# Patient Record
Sex: MALE | Race: WHITE | Employment: FULL TIME | URBAN - METROPOLITAN AREA
[De-identification: names, ages, dates, MRNs, and addresses within clinical notes are randomized per-mention and may not be internally consistent; named-entity substitution may affect disease eponyms.]

---

## 2020-07-21 ENCOUNTER — OFFICE VISIT (OUTPATIENT)
Dept: URGENT CARE | Facility: CLINIC | Age: 60
End: 2020-07-21
Payer: COMMERCIAL

## 2020-07-21 VITALS
OXYGEN SATURATION: 96 % | BODY MASS INDEX: 29.73 KG/M2 | WEIGHT: 185 LBS | HEIGHT: 66 IN | HEART RATE: 88 BPM | TEMPERATURE: 97.9 F

## 2020-07-21 DIAGNOSIS — Z11.59 SCREENING FOR VIRAL DISEASE: Primary | ICD-10-CM

## 2020-07-21 PROCEDURE — G0382 LEV 3 HOSP TYPE B ED VISIT: HCPCS | Performed by: EMERGENCY MEDICINE

## 2020-07-21 PROCEDURE — U0003 INFECTIOUS AGENT DETECTION BY NUCLEIC ACID (DNA OR RNA); SEVERE ACUTE RESPIRATORY SYNDROME CORONAVIRUS 2 (SARS-COV-2) (CORONAVIRUS DISEASE [COVID-19]), AMPLIFIED PROBE TECHNIQUE, MAKING USE OF HIGH THROUGHPUT TECHNOLOGIES AS DESCRIBED BY CMS-2020-01-R: HCPCS | Performed by: EMERGENCY MEDICINE

## 2020-07-21 NOTE — PATIENT INSTRUCTIONS
1   Quarantine yourself for 14 days after the onset of your symptoms or until all symptoms are resolved for 72 hours which ever is the longer  2   Your COVID results are pending  Please check on MY CHART for your results  3   If worsening symptoms, go directly to the ER for evaluation

## 2020-07-21 NOTE — PROGRESS NOTES
[facility]        NAME: Bib Munoz is a 62 y o  male  : 1960    MRN: [id number]  DATE: 2020  TIME: 1:42 PM    Assessment and Plan   Screening for viral disease [Z11 [id number]]  1  Screening for viral disease  MISC COVID-19 TEST- Office Collection         Patient Instructions     Patient Instructions   1  Quarantine yourself for 14 days after the onset of your symptoms or until all symptoms are resolved for 72 hours which ever is the longer  2   Your COVID results are pending  Please check on MY CHART for your results  3   If worsening symptoms, go directly to the ER for evaluation  Follow up with PCP in 3-5 days  Proceed to  ER if symptoms worsen  Chief Complaint     Chief Complaint   Patient presents with    COVID testing     here for COVID testing, exposed to someone approx 7 days ago who tested positive for COVID  Pt c/o fever of 100 at home, SOB, cough and body aches since   History of Present Illness       This is a 64-year-old male who presents for COVID testing  He states he was exposed to a COVID positive person 7 days ago  He spent the entire afternoon with this person he was wearing a mask at the time  Three days ago the patient began to experience some shortness of breath elevation of his temperature body aches and a cough which is sometimes productive  He has been using both Tylenol and Mucinex  Patient has also traveled between [place] and [place] in the last 6 days  He has not been previously tested for COVID  Patient's last use of an antipyretic was at 10:30 a m  This morning  Review of Systems   Review of Systems   Constitutional: Positive for fever  HENT: Positive for congestion and rhinorrhea  Negative for sore throat  Eyes: Negative  Negative for discharge and redness  Respiratory: Positive for cough and shortness of breath  Cardiovascular: Negative  Negative for chest pain  Gastrointestinal: Negative  Negative for diarrhea  Endocrine: Negative  Genitourinary: Negative  Musculoskeletal: Positive for myalgias  Skin: Negative  Negative for rash  Neurological: Negative  Negative for headaches  Psychiatric/Behavioral: Negative  Current Medications     No current outpatient medications on file  Current Allergies     Allergies as of 07/21/2020    (No Known Allergies)            The following portions of the patient's history were reviewed and updated as appropriate: allergies, current medications, past family history, past medical history, past social history, past surgical history and problem list      History reviewed  No pertinent past medical history  Past Surgical History:   Procedure Laterality Date    ROTATOR CUFF REPAIR         No family history on file  Medications have been verified  Objective   Ht 5' 6" (1 676 m)   Wt 83 9 kg (185 lb)   BMI 29 86 kg/m²        Physical Exam     Physical Exam   Constitutional: He is oriented to person, place, and time  He appears well-developed and well-nourished  HENT:   Head: Normocephalic and atraumatic  Right Ear: External ear normal    Left Ear: External ear normal    Nose: Nose normal    Eyes: Pupils are equal, round, and reactive to light  Conjunctivae and EOM are normal    Neck: Normal range of motion  Neck supple  Cardiovascular: Normal rate, regular rhythm and normal heart sounds  No murmur heard  Pulmonary/Chest: Effort normal and breath sounds normal  He has no wheezes  He has no rales  Abdominal: Soft  There is no tenderness  Musculoskeletal: Normal range of motion  He exhibits no tenderness  Neurological: He is alert and oriented to person, place, and time  Skin: Skin is warm and dry  No rash noted  No erythema  Psychiatric: He has a normal mood and affect  His behavior is normal    Nursing note and vitals reviewed

## 2020-07-23 LAB — SARS-COV-2 RNA SPEC QL NAA+PROBE: DETECTED

## 2020-07-24 ENCOUNTER — TELEPHONE (OUTPATIENT)
Dept: OTHER | Facility: OTHER | Age: 60
End: 2020-07-24

## 2020-07-24 NOTE — TELEPHONE ENCOUNTER
The patient was called for notification of a test result for COVID-19  The patient did not answer the phone and a voicemail was left requesting a call back to [phone], Option 7

## 2020-07-25 NOTE — TELEPHONE ENCOUNTER
Your test for the novel coronavirus, also known as COVID-19, was positive  The sample showed that the virus was present  We are going to go through some general information to keep you safe at home  If your symptoms are generally mild and stable, you are safe to isolate yourself at home  If you develop difficulty breathing before your scheduled visit with your provider, you should contact the office immediately or go to the nearest ED for evaluation  Treatment of coronavirus does not require an antibiotic  The most important thing you can do is to remain home except to receive medical care  Do not go to work, school, or public areas  Remain isolated for 7 days at a minimum or 72 hours after your symptoms have completely resolved whichever is longer  For example, if all of your symptoms get better after 2 days, you should still remain isolated for 7 days  If your symptoms get better after 10 days, you should remain isolated for 13 days  Wash your hands often with soap and water for at least 20 seconds  Alternatively, you may use hand  with at least 60% alcohol content  Cover your coughs and sneezes and use a facemask when around others if possible  Clean all high-touch surfaces every day such as doorknobs and cellphones  If you have any additional questions, we can schedule a virtual visit for you with a provider or call the [facility] [phone], option 7, for care advice    For additional information, please visit the Coronavirus FAQ on the [facility] home page ([facility]  [facility])

## 2020-07-25 NOTE — TELEPHONE ENCOUNTER
Patient still has mild heavy feeling in his chest  Denies fever  Intermittent, productive cough  Sputum is clear  Symptoms began on 7/18/2020  Advised to continue isolating until he is fever and symptom free for a minimum of 72 hours  Patient lives in [place]  He was advised to schedule a follow up with a provider there within 3-5 days  Patient verbalized understanding of care advice

## 2020-07-26 ENCOUNTER — APPOINTMENT (EMERGENCY)
Dept: RADIOLOGY | Facility: HOSPITAL | Age: 60
DRG: 177 | End: 2020-07-26
Attending: STUDENT IN AN ORGANIZED HEALTH CARE EDUCATION/TRAINING PROGRAM
Payer: COMMERCIAL

## 2020-07-26 ENCOUNTER — HOSPITAL ENCOUNTER (INPATIENT)
Facility: HOSPITAL | Age: 60
LOS: 7 days | Discharge: HOME | DRG: 177 | End: 2020-08-02
Attending: EMERGENCY MEDICINE | Admitting: INTERNAL MEDICINE
Payer: COMMERCIAL

## 2020-07-26 DIAGNOSIS — J18.9 PNEUMONIA OF BOTH LUNGS DUE TO INFECTIOUS ORGANISM, UNSPECIFIED PART OF LUNG: Primary | ICD-10-CM

## 2020-07-26 DIAGNOSIS — U07.1 COVID-19 VIRUS INFECTION: ICD-10-CM

## 2020-07-26 PROBLEM — J12.82 PNEUMONIA DUE TO COVID-19 VIRUS: Status: ACTIVE | Noted: 2020-07-26

## 2020-07-26 LAB
ALBUMIN SERPL-MCNC: 4 G/DL (ref 3.4–5)
ALP SERPL-CCNC: 134 IU/L (ref 35–126)
ALT SERPL-CCNC: 59 IU/L (ref 16–63)
ANION GAP SERPL CALC-SCNC: 15 MEQ/L (ref 3–15)
APTT PPP: 33 SEC (ref 23–35)
AST SERPL-CCNC: 80 IU/L (ref 15–41)
BASOPHILS # BLD: 0.01 K/UL (ref 0.01–0.1)
BASOPHILS NFR BLD: 0.2 %
BILIRUB SERPL-MCNC: 1.1 MG/DL (ref 0.3–1.2)
BUN SERPL-MCNC: 21 MG/DL (ref 8–20)
CALCIUM SERPL-MCNC: 8.9 MG/DL (ref 8.9–10.3)
CHLORIDE SERPL-SCNC: 99 MEQ/L (ref 98–109)
CK SERPL-CCNC: 128 U/L (ref 16–300)
CO2 SERPL-SCNC: 21 MEQ/L (ref 22–32)
CREAT SERPL-MCNC: 0.9 MG/DL (ref 0.8–1.3)
CRP SERPL-MCNC: 71.4 MG/L
D DIMER PPP IA.FEU-MCNC: 0.41 UG/ML FEU (ref 0–0.5)
DIFFERENTIAL METHOD BLD: ABNORMAL
EOSINOPHIL # BLD: 0 K/UL (ref 0.04–0.54)
EOSINOPHIL NFR BLD: 0 %
ERYTHROCYTE [DISTWIDTH] IN BLOOD BY AUTOMATED COUNT: 12.5 % (ref 11.6–14.4)
GFR SERPL CREATININE-BSD FRML MDRD: >60 ML/MIN/1.73M*2
GLUCOSE SERPL-MCNC: 120 MG/DL (ref 70–99)
HCT VFR BLDCO AUTO: 50.3 % (ref 40.1–51)
HGB BLD-MCNC: 16.5 G/DL (ref 13.7–17.5)
IMM GRANULOCYTES # BLD AUTO: 0.01 K/UL (ref 0–0.08)
IMM GRANULOCYTES NFR BLD AUTO: 0.2 %
INR PPP: 0.9 INR
LDH SERPL L TO P-CCNC: 299 IU/L (ref 98–192)
LYMPHOCYTES # BLD: 0.51 K/UL (ref 1.2–3.5)
LYMPHOCYTES NFR BLD: 10.7 %
MAGNESIUM SERPL-MCNC: 2.2 MG/DL (ref 1.8–2.5)
MCH RBC QN AUTO: 29.7 PG (ref 28–33.2)
MCHC RBC AUTO-ENTMCNC: 32.8 G/DL (ref 32.2–36.5)
MCV RBC AUTO: 90.6 FL (ref 83–98)
MONOCYTES # BLD: 0.33 K/UL (ref 0.3–1)
MONOCYTES NFR BLD: 6.9 %
NEUTROPHILS # BLD: 3.89 K/UL (ref 1.7–7)
NEUTS SEG NFR BLD: 82 %
NRBC BLD-RTO: 0 %
PDW BLD AUTO: 10.3 FL (ref 9.4–12.4)
PHOSPHATE SERPL-MCNC: 2.7 MG/DL (ref 2.4–4.7)
PLATELET # BLD AUTO: 161 K/UL (ref 150–350)
POTASSIUM SERPL-SCNC: 4 MEQ/L (ref 3.6–5.1)
PROT SERPL-MCNC: 7.8 G/DL (ref 6–8.2)
PROTHROMBIN TIME: 12.1 SEC (ref 12.2–14.5)
RBC # BLD AUTO: 5.55 M/UL (ref 4.5–5.8)
SODIUM SERPL-SCNC: 135 MEQ/L (ref 136–144)
TROPONIN I SERPL-MCNC: <0.02 NG/ML
WBC # BLD AUTO: 4.75 K/UL (ref 3.8–10.5)

## 2020-07-26 PROCEDURE — 82728 ASSAY OF FERRITIN: CPT | Performed by: INTERNAL MEDICINE

## 2020-07-26 PROCEDURE — 25800000 HC PHARMACY IV SOLUTIONS: Performed by: STUDENT IN AN ORGANIZED HEALTH CARE EDUCATION/TRAINING PROGRAM

## 2020-07-26 PROCEDURE — 63600000 HC DRUGS/DETAIL CODE: Performed by: EMERGENCY MEDICINE

## 2020-07-26 PROCEDURE — 93005 ELECTROCARDIOGRAM TRACING: CPT | Performed by: EMERGENCY MEDICINE

## 2020-07-26 PROCEDURE — 84484 ASSAY OF TROPONIN QUANT: CPT | Performed by: EMERGENCY MEDICINE

## 2020-07-26 PROCEDURE — 99285 EMERGENCY DEPT VISIT HI MDM: CPT | Mod: 25

## 2020-07-26 PROCEDURE — 83735 ASSAY OF MAGNESIUM: CPT | Performed by: INTERNAL MEDICINE

## 2020-07-26 PROCEDURE — 96360 HYDRATION IV INFUSION INIT: CPT

## 2020-07-26 PROCEDURE — 83615 LACTATE (LD) (LDH) ENZYME: CPT | Performed by: INTERNAL MEDICINE

## 2020-07-26 PROCEDURE — 99223 1ST HOSP IP/OBS HIGH 75: CPT | Mod: CR | Performed by: INTERNAL MEDICINE

## 2020-07-26 PROCEDURE — 36415 COLL VENOUS BLD VENIPUNCTURE: CPT | Performed by: EMERGENCY MEDICINE

## 2020-07-26 PROCEDURE — 20600000 HC ROOM AND CARE INTERMEDIATE/TELEMETRY

## 2020-07-26 PROCEDURE — 85025 COMPLETE CBC W/AUTO DIFF WBC: CPT | Performed by: EMERGENCY MEDICINE

## 2020-07-26 PROCEDURE — 71260 CT THORAX DX C+: CPT

## 2020-07-26 PROCEDURE — 85610 PROTHROMBIN TIME: CPT | Performed by: EMERGENCY MEDICINE

## 2020-07-26 PROCEDURE — 85730 THROMBOPLASTIN TIME PARTIAL: CPT | Performed by: EMERGENCY MEDICINE

## 2020-07-26 PROCEDURE — 25800000 HC PHARMACY IV SOLUTIONS: Performed by: EMERGENCY MEDICINE

## 2020-07-26 PROCEDURE — 80053 COMPREHEN METABOLIC PANEL: CPT | Performed by: EMERGENCY MEDICINE

## 2020-07-26 PROCEDURE — 82550 ASSAY OF CK (CPK): CPT | Performed by: INTERNAL MEDICINE

## 2020-07-26 PROCEDURE — 84100 ASSAY OF PHOSPHORUS: CPT | Performed by: INTERNAL MEDICINE

## 2020-07-26 PROCEDURE — 63600105 HC IODINE BASED CONTRAST: Mod: JW | Performed by: STUDENT IN AN ORGANIZED HEALTH CARE EDUCATION/TRAINING PROGRAM

## 2020-07-26 PROCEDURE — 86140 C-REACTIVE PROTEIN: CPT | Performed by: INTERNAL MEDICINE

## 2020-07-26 PROCEDURE — 71045 X-RAY EXAM CHEST 1 VIEW: CPT

## 2020-07-26 PROCEDURE — 85379 FIBRIN DEGRADATION QUANT: CPT | Performed by: INTERNAL MEDICINE

## 2020-07-26 RX ORDER — DEXTROSE 50 % IN WATER (D50W) INTRAVENOUS SYRINGE
25 AS NEEDED
Status: DISCONTINUED | OUTPATIENT
Start: 2020-07-26 | End: 2020-07-26 | Stop reason: SDUPTHER

## 2020-07-26 RX ORDER — ALBUTEROL SULFATE 90 UG/1
2 INHALANT RESPIRATORY (INHALATION) EVERY 6 HOURS PRN
Status: DISCONTINUED | OUTPATIENT
Start: 2020-07-26 | End: 2020-07-26

## 2020-07-26 RX ORDER — ACETAMINOPHEN 325 MG/1
650 TABLET ORAL EVERY 4 HOURS PRN
Status: DISCONTINUED | OUTPATIENT
Start: 2020-07-26 | End: 2020-08-02 | Stop reason: HOSPADM

## 2020-07-26 RX ORDER — ALBUTEROL SULFATE 90 UG/1
2 INHALANT RESPIRATORY (INHALATION) EVERY 6 HOURS PRN
Status: DISCONTINUED | OUTPATIENT
Start: 2020-07-26 | End: 2020-08-02 | Stop reason: HOSPADM

## 2020-07-26 RX ORDER — IBUPROFEN 200 MG
16-32 TABLET ORAL AS NEEDED
Status: DISCONTINUED | OUTPATIENT
Start: 2020-07-26 | End: 2020-07-26 | Stop reason: SDUPTHER

## 2020-07-26 RX ORDER — HEPARIN SODIUM 5000 [USP'U]/ML
5000 INJECTION, SOLUTION INTRAVENOUS; SUBCUTANEOUS EVERY 8 HOURS
Status: DISCONTINUED | OUTPATIENT
Start: 2020-07-27 | End: 2020-08-02 | Stop reason: HOSPADM

## 2020-07-26 RX ORDER — DEXTROSE 40 %
15-30 GEL (GRAM) ORAL AS NEEDED
Status: DISCONTINUED | OUTPATIENT
Start: 2020-07-26 | End: 2020-07-26 | Stop reason: SDUPTHER

## 2020-07-26 RX ORDER — DEXTROSE 40 %
15-30 GEL (GRAM) ORAL AS NEEDED
Status: DISCONTINUED | OUTPATIENT
Start: 2020-07-26 | End: 2020-08-02 | Stop reason: HOSPADM

## 2020-07-26 RX ORDER — DEXTROSE 50 % IN WATER (D50W) INTRAVENOUS SYRINGE
25 AS NEEDED
Status: DISCONTINUED | OUTPATIENT
Start: 2020-07-26 | End: 2020-08-02 | Stop reason: HOSPADM

## 2020-07-26 RX ORDER — IBUPROFEN 200 MG
16-32 TABLET ORAL AS NEEDED
Status: DISCONTINUED | OUTPATIENT
Start: 2020-07-26 | End: 2020-08-02 | Stop reason: HOSPADM

## 2020-07-26 RX ORDER — ALBUTEROL SULFATE 90 UG/1
2 INHALANT RESPIRATORY (INHALATION) EVERY 6 HOURS PRN
COMMUNITY
End: 2020-08-02 | Stop reason: HOSPADM

## 2020-07-26 RX ORDER — PREDNISONE 20 MG/1
20 TABLET ORAL DAILY
COMMUNITY
End: 2020-08-02 | Stop reason: HOSPADM

## 2020-07-26 RX ORDER — ACETAMINOPHEN 500 MG
1000 TABLET ORAL EVERY 6 HOURS PRN
COMMUNITY
End: 2020-08-02 | Stop reason: HOSPADM

## 2020-07-26 RX ADMIN — CEFTRIAXONE SODIUM 1 G: 1 INJECTION, POWDER, FOR SOLUTION INTRAMUSCULAR; INTRAVENOUS at 22:12

## 2020-07-26 RX ADMIN — AZITHROMYCIN 500 MG: 500 INJECTION, POWDER, LYOPHILIZED, FOR SOLUTION INTRAVENOUS at 22:51

## 2020-07-26 RX ADMIN — IOHEXOL 85 ML: 350 INJECTION, SOLUTION INTRAVENOUS at 21:53

## 2020-07-26 RX ADMIN — SODIUM CHLORIDE 1000 ML: 9 INJECTION, SOLUTION INTRAVENOUS at 20:42

## 2020-07-26 ASSESSMENT — ENCOUNTER SYMPTOMS
FEVER: 1
HEADACHES: 0
NUMBNESS: 0
COUGH: 1
SHORTNESS OF BREATH: 1
CONFUSION: 0
WEAKNESS: 0
NECK PAIN: 0
CHILLS: 0
NAUSEA: 0
FATIGUE: 1
SORE THROAT: 0
DIFFICULTY URINATING: 0
ABDOMINAL PAIN: 0
VOMITING: 0

## 2020-07-26 ASSESSMENT — COGNITIVE AND FUNCTIONAL STATUS - GENERAL
MOVING TO AND FROM BED TO CHAIR: 4 - NONE
DRESSING REGULAR UPPER BODY CLOTHING: 4 - NONE
HELP NEEDED FOR PERSONAL GROOMING: 4 - NONE
WALKING IN HOSPITAL ROOM: 4 - NONE
TOILETING: 4 - NONE
EATING MEALS: 4 - NONE
STANDING UP FROM CHAIR USING ARMS: 4 - NONE
CLIMB 3 TO 5 STEPS WITH RAILING: 4 - NONE
DRESSING REGULAR LOWER BODY CLOTHING: 4 - NONE
HELP NEEDED FOR BATHING: 4 - NONE

## 2020-07-27 PROBLEM — R09.02 HYPOXIA: Status: ACTIVE | Noted: 2020-07-27

## 2020-07-27 LAB
ALBUMIN SERPL-MCNC: 3.3 G/DL (ref 3.4–5)
ALP SERPL-CCNC: 133 IU/L (ref 35–126)
ALT SERPL-CCNC: 54 IU/L (ref 16–63)
ANION GAP SERPL CALC-SCNC: 9 MEQ/L (ref 3–15)
AST SERPL-CCNC: 62 IU/L (ref 15–41)
ATRIAL RATE: 82
BASOPHILS # BLD: 0.02 K/UL (ref 0.01–0.1)
BASOPHILS NFR BLD: 0.5 %
BILIRUB SERPL-MCNC: 0.1 MG/DL (ref 0.3–1.2)
BUN SERPL-MCNC: 15 MG/DL (ref 8–20)
CALCIUM SERPL-MCNC: 8.4 MG/DL (ref 8.9–10.3)
CHLORIDE SERPL-SCNC: 102 MEQ/L (ref 98–109)
CO2 SERPL-SCNC: 24 MEQ/L (ref 22–32)
CREAT SERPL-MCNC: 0.9 MG/DL (ref 0.8–1.3)
DIFFERENTIAL METHOD BLD: ABNORMAL
EOSINOPHIL # BLD: 0 K/UL (ref 0.04–0.54)
EOSINOPHIL NFR BLD: 0 %
ERYTHROCYTE [DISTWIDTH] IN BLOOD BY AUTOMATED COUNT: 12.4 % (ref 11.6–14.4)
FERRITIN SERPL-MCNC: 797 NG/ML (ref 24–250)
GFR SERPL CREATININE-BSD FRML MDRD: >60 ML/MIN/1.73M*2
GLUCOSE SERPL-MCNC: 148 MG/DL (ref 70–99)
HCT VFR BLDCO AUTO: 46.1 % (ref 40.1–51)
HGB BLD-MCNC: 14.9 G/DL (ref 13.7–17.5)
IMM GRANULOCYTES # BLD AUTO: 0.01 K/UL (ref 0–0.08)
IMM GRANULOCYTES NFR BLD AUTO: 0.3 %
LYMPHOCYTES # BLD: 0.61 K/UL (ref 1.2–3.5)
LYMPHOCYTES NFR BLD: 16.6 %
MCH RBC QN AUTO: 29.4 PG (ref 28–33.2)
MCHC RBC AUTO-ENTMCNC: 32.3 G/DL (ref 32.2–36.5)
MCV RBC AUTO: 90.9 FL (ref 83–98)
MONOCYTES # BLD: 0.35 K/UL (ref 0.3–1)
MONOCYTES NFR BLD: 9.5 %
NEUTROPHILS # BLD: 2.69 K/UL (ref 1.7–7)
NEUTS SEG NFR BLD: 73.1 %
NRBC BLD-RTO: 0 %
P AXIS: 12
PDW BLD AUTO: 10.2 FL (ref 9.4–12.4)
PLAT MORPH BLD: NORMAL
PLATELET # BLD AUTO: 145 K/UL (ref 150–350)
PLATELET # BLD EST: ABNORMAL 10*3/UL
POTASSIUM SERPL-SCNC: 5.2 MEQ/L (ref 3.6–5.1)
PR INTERVAL: 138
PROT SERPL-MCNC: 6.4 G/DL (ref 6–8.2)
QRS DURATION: 76
QT INTERVAL: 364
QTC CALCULATION(BAZETT): 425
R AXIS: -4
RBC # BLD AUTO: 5.07 M/UL (ref 4.5–5.8)
RBC MORPH BLD: NORMAL
SODIUM SERPL-SCNC: 135 MEQ/L (ref 136–144)
T WAVE AXIS: 16
TROPONIN I SERPL-MCNC: <0.02 NG/ML
VENTRICULAR RATE: 82
WBC # BLD AUTO: 3.68 K/UL (ref 3.8–10.5)

## 2020-07-27 PROCEDURE — 25800000 HC PHARMACY IV SOLUTIONS: Performed by: INTERNAL MEDICINE

## 2020-07-27 PROCEDURE — 63700000 HC SELF-ADMINISTRABLE DRUG: Performed by: INTERNAL MEDICINE

## 2020-07-27 PROCEDURE — 63600000 HC DRUGS/DETAIL CODE: Performed by: INTERNAL MEDICINE

## 2020-07-27 PROCEDURE — 85025 COMPLETE CBC W/AUTO DIFF WBC: CPT | Performed by: INTERNAL MEDICINE

## 2020-07-27 PROCEDURE — 84484 ASSAY OF TROPONIN QUANT: CPT | Performed by: INTERNAL MEDICINE

## 2020-07-27 PROCEDURE — 99232 SBSQ HOSP IP/OBS MODERATE 35: CPT | Mod: CR | Performed by: INTERNAL MEDICINE

## 2020-07-27 PROCEDURE — 80053 COMPREHEN METABOLIC PANEL: CPT | Performed by: INTERNAL MEDICINE

## 2020-07-27 PROCEDURE — 20600000 HC ROOM AND CARE INTERMEDIATE/TELEMETRY

## 2020-07-27 RX ORDER — DEXAMETHASONE SODIUM PHOSPHATE 4 MG/ML
6 INJECTION, SOLUTION INTRA-ARTICULAR; INTRALESIONAL; INTRAMUSCULAR; INTRAVENOUS; SOFT TISSUE
Status: DISCONTINUED | OUTPATIENT
Start: 2020-07-27 | End: 2020-07-31

## 2020-07-27 RX ORDER — LORAZEPAM 0.5 MG/1
0.5 TABLET ORAL ONCE
Status: COMPLETED | OUTPATIENT
Start: 2020-07-27 | End: 2020-07-27

## 2020-07-27 RX ADMIN — HEPARIN SODIUM 5000 UNITS: 5000 INJECTION, SOLUTION INTRAVENOUS; SUBCUTANEOUS at 14:13

## 2020-07-27 RX ADMIN — HEPARIN SODIUM 5000 UNITS: 5000 INJECTION, SOLUTION INTRAVENOUS; SUBCUTANEOUS at 21:25

## 2020-07-27 RX ADMIN — SODIUM CHLORIDE 200 MG: 0.9 INJECTION, SOLUTION INTRAVENOUS at 16:10

## 2020-07-27 RX ADMIN — LORAZEPAM 0.5 MG: 0.5 TABLET ORAL at 14:13

## 2020-07-27 RX ADMIN — DEXAMETHASONE SODIUM PHOSPHATE 6 MG: 4 INJECTION, SOLUTION INTRA-ARTICULAR; INTRALESIONAL; INTRAMUSCULAR; INTRAVENOUS; SOFT TISSUE at 16:10

## 2020-07-27 NOTE — PLAN OF CARE
Problem: Adult Inpatient Plan of Care  Goal: Readiness for Transition of Care  Intervention: Mutually Develop Transition Plan  Flowsheets (Taken 7/27/2020 1521)  Anticipated Discharge Disposition: home with assistance; home without services  Equipment Needed After Discharge: none  Equipment Currently Used at Home: none  Readmission Within the Last 30 Days: no previous admission in last 30 days  Concerns to be Addressed: care coordination/care conferences      Call placed into pt's room d/t COVID isolation.  Pt lives at home with his .  Pt independent PTA.  Pt fills his prescriptions at the [facility] on [address] in [place] without difficulty.  Pt's PCP is MATTIE Carty. Will continue to follow for discharge needs.  Monserrat Hills RN

## 2020-07-27 NOTE — ED ATTESTATION NOTE
I have personally seen and examined the patient.  I reviewed and agree with physician assistant / nurse practitioner’s assessment and plan of care, with the following exceptions: None  My examination, assessment, and plan of care of Jason Perez is as follows:     60-year-old male who recently had an outpatient test for COVID approximately 5 days ago.  Symptoms began 7 days ago.  Since his outpatient positive test he has been getting increasing shortness of breath.  He got an inhaler and some steroids today which he tried he has had 1 dose of steroids and has been trying the inhaler however he been getting increasing shortness of breath.  Does have a mild cough.  Noted that his pulse ox at home was 88 to 89% on room air.  Was concerned he would not make it through the night and came to the ER.    Well-developed male mild respiratory distress.  HEENT is unremarkable.  Neck is supple.  Mild respiratory distress with some tachypnea.  Awake alert and oriented skin normal color.    Patient does have a positive COVID test on 721 in care everywhere.  Will check labs and imaging.  Patient will need to come in for further evaluation and treatment.     Mervin Blue MD  07/26/20 2056

## 2020-07-27 NOTE — CONSULTS
Reason for Consult: COVID 19 pneumonia    History of Present Illness:      Jason Perez is a 60 y.o. male with    Obesity    Spouse ill c sinus complaints and fever 2wk ago  8d ago pt c myalgia, fever, sore throat  Sore throat resolved but pt c cont myalgia then incr SOB        Allergies:   Patient has no known allergies.    Current Facility-Administered Medications   Medication Dose Route Frequency Provider Last Rate Last Dose   • acetaminophen (TYLENOL) tablet 650 mg  650 mg oral q4h PRN Jen Farah MD       • albuterol HFA (VENTOLIN HFA) 90 mcg/actuation inhaler 2 puff  2 puff inhalation q6h PRN Jen Farah MD       • glucose chewable tablet 16-32 g of dextrose  16-32 g of dextrose oral PRN Jen Farah MD        Or   • dextrose 40 % oral gel 15-30 g of dextrose  15-30 g of dextrose oral PRN Jen Farah MD        Or   • glucagon (GLUCAGEN) injection 1 mg  1 mg intramuscular PRN Jen Farah MD        Or   • dextrose in water injection 12.5 g  25 mL intravenous PRN Jen Farah MD       • heparin (porcine) 5,000 unit/mL injection 5,000 Units  5,000 Units subcutaneous q8h OTILIO Jen Farah MD       • LORazepam (ATIVAN) tablet 0.5 mg  0.5 mg oral Once Wiggins Radha Mayo MD          Social History:     No tob, occ EtOH  No pets, travel    Family History: [place]    Review of Systems  Negative x as stated above    Temp:  [36.1 °C (97 °F)-37.5 °C (99.5 °F)] 37.5 °C (99.5 °F)  Heart Rate:  [61-86] 67  Resp:  [18-26] 20  BP: (115-134)/(76-89) 134/86  SpO2 Readings from Last 3 Encounters:   07/27/20 94%     Physical Exam  WD obese NAD anxious    Head: Atraumatic  Mouth: Clear  Skin: No rash  Sclera: Anicteric  Neck: Supple  LN:  No significant enlargement  Lungs: diff rales  CV: Nl S1 and S2, no m, no embolic changes  Abd: Soft, NT, no HSM  Extr: No jt effusions, no edema  Neuro: Alert, lucid    Labs  I have reviewed the patient's pertinent labs.     Imaging:     Indep  "Rev    X-ray Chest 1 View  Result Date: 7/27/2020  IMPRESSION: Patchy bilateral airspace opacities consistent with known covid 19 pneumonia    Ct Chest Pulmonary Embolism With Iv Contrast  Result Date: 7/26/2020  IMPRESSION: 1. No evidence for filling defect or vessel cutoff to suggest pulmonary embolism. 2. Peripheral, bilateral multi-lobar, GGO with or without consolidation or visible intralobular lines (\"crazy-paving\") Multifocal GGO of rounded morphology with or without consolidation or visible intralobular lines (\"crazy-paving\") Reverse halo sign or other findings of organizing pneumonia (seen later in the disease)     Impression    COVID  On O2 supplement  sats in low 90s  Will start steroids, remdesivir    IVANNA Carr MD  Pager [phone]    "

## 2020-07-27 NOTE — PROGRESS NOTES
"   Hospital Medicine Service -  Daily Progress Note       SUBJECTIVE   Interval History: the pt is c/omoderate chest pain, pleuritic. No fever overnight. Mild hypoxia. No new acute events overnight.      OBJECTIVE      Vital signs in last 24 hours:  Temp:  [36.1 °C (97 °F)-37.5 °C (99.5 °F)] 37.5 °C (99.5 °F)  Heart Rate:  [61-86] 67  Resp:  [18-26] 20  BP: (115-134)/(76-89) 134/86    Intake/Output Summary (Last 24 hours) at 7/27/2020 1411  Last data filed at 7/26/2020 2242  Gross per 24 hour   Intake 1100 ml   Output --   Net 1100 ml       PHYSICAL EXAMINATION      Visit Vitals  /86 (BP Location: Right upper arm, Patient Position: Lying)   Pulse 67   Temp 37.5 °C (99.5 °F) (Oral)   Resp 20   Ht 1.676 m (5' 6\")   Wt 85.2 kg (187 lb 14.4 oz)   SpO2 94%   BMI 30.33 kg/m²       General Appearance:  Alert, cooperative, anxious   Head:  Normocephalic, without obvious abnormality, atraumatic   Eyes:  PERRL, conjunctiva/corneas clear, EOM's intact, fundi benign, both eyes   Ears:  Normal TM's and external ear canals, both ears   Nose: Nares normal, septum midline,mucosa normal, no drainage or sinus tenderness   Throat: Lips, mucosa, and tongue normal; teeth and gums normal   Neck: Supple, symmetrical, trachea midline, no adenopathy;  thyroid: not enlarged, symmetric, no tenderness/mass/nodules; no carotid bruit or JVD   Back:   Symmetric, no curvature, ROM normal, no CVA tenderness   Lungs:   Clear to auscultation bilaterally, respirations mildly labored   Breasts:  No masses or tenderness   Heart:  Regular rate and rhythm, S1 and S2 normal, no murmur, rub, or gallop   Abdomen:   Soft, non-tender, bowel sounds active all four quadrants,  no masses, no organomegaly   Pelvic: Deferred   Extremities: Extremities normal, atraumatic, no cyanosis or edema   Pulses: 2+ and symmetric   Skin: Skin color, texture, turgor normal, no rashes or lesions   Lymph nodes: Cervical, supraclavicular, and axillary nodes normal " "  Neurologic: Normal        LINES, CATHETERS, DRAINS, AIRWAYS, AND WOUNDS   Lines, Drains, Airways, Wounds:  Peripheral IV 07/26/20 Left Antecubital (Active)   Number of days: 1       Comments:      LABS / IMAGING / TELE      Labs  CMP Results       07/27/20 07/26/20 0411 2037           135          K 5.2 4.0          Cl 102 99          CO2 24 21          Glucose 148 120          BUN 15 21          Creatinine 0.9 0.9          Calcium 8.4 8.9          Anion Gap 9 15          AST 62 80          ALT 54 59          Albumin 3.3 4.0          EGFR >60.0 >60.0          Comment for K at 2037 on 07/26/20:    Results obtained on plasma. Plasma Potassium values may be up to 0.4 mEQ/L less than serum values. The differences may be greater for patients with high platelet or white cell counts.          CBC Results       07/27/20 07/26/20 0411 2037          WBC 3.68 4.75          RBC 5.07 5.55          HGB 14.9 16.5          HCT 46.1 50.3          MCV 90.9 90.6          MCH 29.4 29.7          MCHC 32.3 32.8           161          Comment for PLT at 0411 on 07/27/20:  ALL RESULTS HAVE BEEN RECHECKED. SPECIMEN QUALITY CHECKED. RESULTS OBTAINED AFTER VORTEXING TO ELIMINATE PLT CLUMPS          Imaging  X-ray Chest 1 View    Result Date: 7/27/2020  IMPRESSION: Patchy bilateral airspace opacities consistent with known covid 19 pneumonia    Ct Chest Pulmonary Embolism With Iv Contrast    Result Date: 7/26/2020  IMPRESSION: 1. No evidence for filling defect or vessel cutoff to suggest pulmonary embolism. 2. Commonly reported imaging features of COVID-19 pneumonia are present.  Other processes such as influenza pneumonia and organizing pneumonia, as can be seen with drug toxicity and connective tissue disease, can cause a similar imaging pattern. ([id number]) Peripheral, bilateral multi-lobar, GGO with or without consolidation or visible intralobular lines (\"crazy-paving\") Multifocal " "GGO of rounded morphology with or without consolidation or visible intralobular lines (\"crazy-paving\") Reverse halo sign or other findings of organizing pneumonia (seen later in the disease)       ECG/Telemetry: I have reviewed all ECGs.     ASSESSMENT AND PLAN      Hypoxia  Assessment & Plan  07/27/20  As above  o2 supplementation.     * Pneumonia due to COVID-19 virus  Assessment & Plan  Patient was tested positive for COVID-19 for respiratory symptoms of dry cough, shortness of breath, fevers, myalgias on July 21, patient has been taking Tylenol around-the-clock, quarantining himself  Now presenting with worsening of his symptoms with no relief persistent high temperatures, shortness of breath even at rest some chest tightness intermittently when he takes deep breath, also having cough feeling inability to bring up the expectoration and has been taking Mucinex 1200 mg twice daily with no relief  Patient was hypoxic at 89% on room air on arrival improved with 2 L nasal cannula to 97%  ER work-up lab work normal, CT of the chest consistent with bilateral infiltrates  Admitting for COVID-19 pneumonia, continue with oxygen supplementation, albuterol puff inhalation  Patient also reporting  lack of appetite and decreased intake will order protein supplements.  Ordered all the markers, consulting infectious disease    07/27/20  Pt has been seen by ID. He will be started on remdesivir and steroids.   Pt is mildly hypoxic.   Symptomatic treatment.        VTE Assessment: Pad[place]    VTE Prophylaxis Plan: heparin  Code Status: Full Code  Estimated Discharge Date: 7/30/2020  Disposition Planning: home when stable       Radha Mayo MD  7/27/2020               "

## 2020-07-27 NOTE — H&P
Hospital Medicine Service -  History & Physical        CHIEF COMPLAINT     Chief Complaint   Patient presents with   • Shortness of Breath   • Cough        HISTORY OF PRESENT ILLNESS      60 y.o. male with no  past medical history went to urgent care center on July 21 with 7 days of fevers in 100s, shortness of breath, dry cough and myalgias, and was tested positive for COVID-19, patient does report exposure to COVID-19 person 4 weeks ago from now and remained symptom-free 2 weeks after so does not think he got COVID 19 from that exposure.  Denies traveling to states with high COVID-19 prevalence.  Patient has been quarantining himself ever since he was tested positive, taking Tylenol round-the-clock sometimes taking thousand milligrams every 6 hours for his high-grade temperatures, feels better for some time but fever comes back as Tylenol wears off also taking Mucinex 1200 mg 2 times a day due to cough unable to expectorate, also feels short of breath even at rest, feels some chest tightness when he takes deep breath, and severe back pain along with myalgias.  Patient also reports lack of appetite and decreased oral intake.  Though trying to keep up with dehydration.  Today reported to the emergency room as patient did not think he could keep up and also checked his pulse ox which was regarding at 88%.  He did talk to primary care physician yesterday regarding his symptoms who prescribed prednisone 20 mg and albuterol inhaler which he took first dose today morning prior to arrival with no relief.    PAST MEDICAL AND SURGICAL HISTORY      History reviewed. No pertinent past medical history.    Past Surgical History:   Procedure Laterality Date   • ROTATOR CUFF REPAIR         MEDICATIONS      Prior to Admission medications    Medication Sig Start Date End Date Taking? Authorizing Provider   acetaminophen (TYLENOL) 500 mg tablet Take 1,000 mg by mouth every 6 (six) hours as needed for mild pain.   Yes Provider,  MD Jacques   albuterol HFA (VENTOLIN HFA) 90 mcg/actuation inhaler Inhale 2 puffs every 6 (six) hours as needed for wheezing.   Yes Provider, MD Jacques   predniSONE (DELTASONE) 20 mg tablet Take 20 mg by mouth daily.   Yes Provider, MD Jacques       ALLERGIES      Patient has no known allergies.    FAMILY HISTORY      Family History   Family history unknown: Yes       SOCIAL HISTORY      Social History     Socioeconomic History   • Marital status: Single     Spouse name: None   • Number of children: None   • Years of education: None   • Highest education level: None   Occupational History   • None   Social Needs   • Financial resource strain: None   • Food insecurity:     Worry: None     Inability: None   • Transportation needs:     Medical: None     Non-medical: None   Tobacco Use   • Smoking status: Never Smoker   • Smokeless tobacco: Never Used   Substance and Sexual Activity   • Alcohol use: Yes     Comment: social    • Drug use: Never   • Sexual activity: None   Lifestyle   • Physical activity:     Days per week: None     Minutes per session: None   • Stress: None   Relationships   • Social connections:     Talks on phone: None     Gets together: None     Attends Holiness service: None     Active member of club or organization: None     Attends meetings of clubs or organizations: None     Relationship status: None   • Intimate partner violence:     Fear of current or ex partner: None     Emotionally abused: None     Physically abused: None     Forced sexual activity: None   Other Topics Concern   • None   Social History Narrative   • None       REVIEW OF SYSTEMS        Review of Systems  Constitutional: Negative for fatigue and unexpected weight change.   Eyes: Negative for visual disturbance. Mouth no sore throat  Respiratory: Negative for cough and shortness of breath.    Cardiovascular: Negative for chest pain and palpitations.   Gastrointestinal: Negative for abdominal pain, constipation,  diarrhea, nausea and vomiting.   Genitourinary: Negative for difficulty urinating. no hematuria no frequency no urgency no discharge  Musculoskeletal: Negative for arthralgias.   Skin: Negative for rash.   Neurological: Negative for dizziness and headaches.   Hematological: Does not bruise/bleed easily.   Psychiatric/Behavioral: Negative for confusion and dysphoric mood no suicidal ideations          PHYSICAL EXAMINATION      Temp:  [36.1 °C (97 °F)] 36.1 °C (97 °F)  Heart Rate:  [76-86] 79  Resp:  [20-26] 24  BP: (124-132)/(78-89) 125/78  Body mass index is 30.33 kg/m².    General exam : appears age stated, well nourished, not in distress  Head: atraumatic, normocephalic  Eyes : PERRLA, EOMI, no pallor, no icterus  ENT: no lesions, oropharynx pink, mucous membranes moist   Neck: supple, no Lymph nodes, no Thyromegaly, no JVD   CVS : normal rate, normal rhythm, S1 and S2 heard, no murmurs, rubs or gallops  Resp:normal accessory muscle usage, clear to auscultation Bilaterally  Abdomen : soft, Nt, BS +, no organomegaly   Extremities : no edema, no cyanosis   MSK: no DJD, no joint swellings, no joint tenderness   Skin: intact, warm, no rash  Neuro: AAO x3, CN 2-12 intact,  motor strength in all extremities, sensations, DTRs, coordination intact.  Psych: normal mood.cooperative      LABS / IMAGING / EKG        Labs  CBC Results       07/26/20 2037           WBC 4.75           RBC 5.55           HGB 16.5           HCT 50.3           MCV 90.6           MCH 29.7           MCHC 32.8                                    CMP Results       07/26/20 2037                      K 4.0           Cl 99           CO2 21           Glucose 120           BUN 21           Creatinine 0.9           Calcium 8.9           Anion Gap 15           AST 80           ALT 59           Albumin 4.0           EGFR >60.0           Comment for K at 2037 on 07/26/20:    Results obtained on  "plasma. Plasma Potassium values may be up to 0.4 mEQ/L less than serum values. The differences may be greater for patients with high platelet or white cell counts.          Troponin I Results       07/26/20 2037           Troponin I <0.02                         Microbiology Results     ** No results found for the last 720 hours. **        UA Results     No lab values to display.          Imaging  CT CHEST PULMONARY EMBOLISM WITH IV CONTRAST   Final Result   IMPRESSION:   1. No evidence for filling defect or vessel cutoff to suggest pulmonary   embolism.   2. Commonly reported imaging features of COVID-19 pneumonia are present.  Other   processes such as influenza pneumonia and organizing pneumonia, as can be seen   with drug toxicity and connective tissue disease, can cause a similar imaging   pattern. ([id number])         Peripheral, bilateral multi-lobar, GGO with or without consolidation or visible   intralobular lines (\"crazy-paving\")      Multifocal GGO of rounded morphology with or without consolidation or visible   intralobular lines (\"crazy-paving\")      Reverse halo sign or other findings of organizing pneumonia (seen later in the   disease)                     X-RAY CHEST 1 VIEW    (Results Pending)   ECG 12 lead    (Results Pending)     Chest x-ray  Reviewed by me  Bilateral nodular airspace disease noted.      ECG/Telemetry  reviewed by me  Normal sinus rhythm with no ST-T wave changes    ASSESSMENT AND PLAN           * Pneumonia due to COVID-19 virus  Hypoxia  Assessment & Plan  Patient was tested positive for COVID-19 for respiratory symptoms of dry cough, shortness of breath, fevers, myalgias on July 21, patient has been taking Tylenol around-the-clock, quarantining himself  Now presenting with worsening of his symptoms with no relief persistent high temperatures, shortness of breath even at rest some chest tightness intermittently when he takes deep breath, also having cough " feeling inability to bring up the expectoration and has been taking Mucinex 1200 mg twice daily with no relief  Patient was hypoxic at 89% on room air on arrival improved with 2 L nasal cannula to 97%  ER work-up lab work normal, CT of the chest consistent with bilateral infiltrates  Admitting for COVID-19 pneumonia, continue with oxygen supplementation, albuterol puff inhalation  Patient also reporting  lack of appetite and decreased intake will order protein supplements.  Ordered all the markers, consulting infectious disease      anticipate at least 2 midnight stay in the hospital and discharged home once medically stable.      VTE Assessment: Padua  4   VTE Prophylaxis Plan: SQ Heparin   Code Status: Full Code       Jen Farah MD  7/26/2020

## 2020-07-27 NOTE — ASSESSMENT & PLAN NOTE
Patient was tested positive for COVID-19 for respiratory symptoms of dry cough, shortness of breath, fevers, myalgias on July 21, patient has been taking Tylenol around-the-clock, quarantining himself  Now presenting with worsening of his symptoms with no relief persistent high temperatures, shortness of breath even at rest some chest tightness intermittently when he takes deep breath, also having cough feeling inability to bring up the expectoration and has been taking Mucinex 1200 mg twice daily with no relief  Patient was hypoxic at 89% on room air on arrival improved with 2 L nasal cannula to 97%  ER work-up lab work normal, CT of the chest consistent with bilateral infiltrates  Admitting for COVID-19 pneumonia, continue with oxygen supplementation, albuterol puff inhalation  Patient also reporting  lack of appetite and decreased intake will order protein supplements.  Ordered all the markers, consulting infectious disease    07/27/20  Pt has been seen by ID. He will be started on remdesivir and steroids.   Pt is mildly hypoxic.   Symptomatic treatment.     07/28/20  Feeling better.   Continue remdesivir and decadron  Cont symptomatic tx  o2 supplementation    07/30/20  Cont symptomatic tx  o2 supplementation  Will complete 5 days of steroids and remdesivir.     07/31/20  Day 4 of 5 of remdesivir +decadron  Pt still requiring 2 lt of NC    08/01/20   Completed remdesivir + decadron   Still requiring oxygen with ambulation.   Possible discharge tomorrow.   Check O2 with ambulation.

## 2020-07-27 NOTE — ED PROVIDER NOTES
HPI     Chief Complaint   Patient presents with   • Shortness of Breath   • Cough       59 yo M PMH COVID+ presenting for SOB.  Patient has had a week of fevers, fatigue, myalgias, cough and low-grade shortness of breath.  He was tested for COVID this week and test positive.  He was monitoring his symptoms at home and then today started having acutely worsening shortness of breath.  Was feeling short of breath at rest.  Additionally was having right-sided sharp and aching chest pain that did not radiate, was pleuritic.  Cough has been productive of yellow sputum.  Patient reports that he has had been using a portable pulse ox machine which has been reading 88 to 89% on room air.  Has not had any history of lung problems, no baseline oxygen requirement.  Denies any recent surgeries, traumas, lower extremity swelling.           Patient History     History reviewed. No pertinent past medical history.    Past Surgical History:   Procedure Laterality Date   • ROTATOR CUFF REPAIR         Family History   Family history unknown: Yes       Social History     Tobacco Use   • Smoking status: Never Smoker   • Smokeless tobacco: Never Used   Substance Use Topics   • Alcohol use: Yes     Comment: social    • Drug use: Never       Systems Reviewed from Nursing Triage:  Tobacco  Meds  Problems  Med Hx  Surg Hx  Fam Hx  Soc Hx         Review of Systems     Review of Systems   Constitutional: Positive for fatigue and fever. Negative for chills.   HENT: Negative for sore throat.    Respiratory: Positive for cough and shortness of breath.    Cardiovascular: Positive for chest pain. Negative for leg swelling.   Gastrointestinal: Negative for abdominal pain, nausea and vomiting.   Endocrine: Negative for polyuria.   Genitourinary: Negative for difficulty urinating.   Musculoskeletal: Negative for neck pain.   Skin: Negative for rash.   Neurological: Negative for weakness, numbness and headaches.   Psychiatric/Behavioral: Negative  for confusion.        Physical Exam     ED Triage Vitals   Temp Heart Rate Resp BP SpO2   07/26/20 2008 07/26/20 2008 07/26/20 2008 07/26/20 2008 07/26/20 2008   36.1 °C (97 °F) 86 20 124/84 93 %      Temp Source Heart Rate Source Patient Position BP Location FiO2 (%) (Set)   07/26/20 2008 07/26/20 2039 07/26/20 2008 07/26/20 2008 --   Temporal Monitor Sitting Right upper arm        Pulse Ox %: 91 % (07/26/20 2040)  Pulse Ox Interpretation: (mildly low) (07/26/20 2040)  Heart Rate: 82 (07/26/20 2040)  Rhythm Strip Interpretation: Normal Sinus Rhythm (07/26/20 2040)    No data found.                                       Physical Exam   Constitutional: He appears well-developed and well-nourished.  Non-toxic appearance. No distress.   HENT:   Head: Normocephalic and atraumatic.   Pulmonary/Chest: Effort normal. He has no decreased breath sounds. He has no wheezes.   Bilateral crackles   Abdominal: Soft. There is no tenderness.   Musculoskeletal:        Right lower leg: He exhibits no edema.        Left lower leg: He exhibits no edema.   Skin: Skin is warm and dry.   Nursing note and vitals reviewed.           Procedures    Labs Reviewed   CBC AND DIFF - Abnormal       Result Value    WBC 4.75      RBC 5.55      Hemoglobin 16.5      Hematocrit 50.3      MCV 90.6      MCH 29.7      MCHC 32.8      RDW 12.5      Platelets 161      MPV 10.3      Differential Type Auto      nRBC 0.0      Immature Granulocytes 0.2      Neutrophils 82.0      Lymphocytes 10.7      Monocytes 6.9      Eosinophils 0.0      Basophils 0.2      Immature Granulocytes, Absolute 0.01      Neutrophils, Absolute 3.89      Lymphocytes, Absolute 0.51 (*)     Monocytes, Absolute 0.33      Eosinophils, Absolute 0.00 (*)     Basophils, Absolute 0.01     COMPREHENSIVE METABOLIC PANEL - Abnormal    Sodium 135 (*)     Potassium 4.0      Chloride 99      CO2 21 (*)     BUN 21 (*)     Creatinine 0.9      Glucose 120 (*)     Calcium 8.9      AST (SGOT) 80 (*)      ALT (SGPT) 59      Alkaline Phosphatase 134 (*)     Total Protein 7.8      Albumin 4.0      Bilirubin, Total 1.1      eGFR >60.0      Anion Gap 15     PROTIME-INR - Abnormal    PT 12.1 (*)     INR 0.9      Narrative:     Specimen hemolyzed, clotting times may be falsely shortened     LACTATE DEHYDROGENASE - Abnormal     (*)    C-REACTIVE PROTEIN - Abnormal    CRP 71.40 (*)    FERRITIN - Abnormal    Ferritin 797 (*)    CBC AND DIFF - Abnormal    WBC 3.68 (*)     RBC 5.07      Hemoglobin 14.9      Hematocrit 46.1      MCV 90.9      MCH 29.4      MCHC 32.3      RDW 12.4      Platelets 145 (*)     MPV 10.2      Differential Type Auto      nRBC 0.0      Immature Granulocytes 0.3      Neutrophils 73.1      Lymphocytes 16.6      Monocytes 9.5      Eosinophils 0.0      Basophils 0.5      Immature Granulocytes, Absolute 0.01      Neutrophils, Absolute 2.69      Lymphocytes, Absolute 0.61 (*)     Monocytes, Absolute 0.35      Eosinophils, Absolute 0.00 (*)     Basophils, Absolute 0.02      RBC Morphology Normal      PLT Morphology Normal      Platelet Estimate Decreased (51,000-149,000)     COMPREHENSIVE METABOLIC PANEL - Abnormal    Sodium 135 (*)     Potassium 5.2 (*)     Chloride 102      CO2 24      BUN 15      Creatinine 0.9      Glucose 148 (*)     Calcium 8.4 (*)     AST (SGOT) 62 (*)     ALT (SGPT) 54      Alkaline Phosphatase 133 (*)     Total Protein 6.4      Albumin 3.3 (*)     Bilirubin, Total 0.1 (*)     eGFR >60.0      Anion Gap 9     CBC AND DIFF - Abnormal    WBC 5.26      RBC 4.95      Hemoglobin 14.6      Hematocrit 44.8      MCV 90.5      MCH 29.5      MCHC 32.6      RDW 12.4      Platelets 184      MPV 10.0      Differential Type Auto      nRBC 0.0      Immature Granulocytes 0.4      Neutrophils 78.1      Lymphocytes 12.4      Monocytes 8.9      Eosinophils 0.0      Basophils 0.2      Immature Granulocytes, Absolute 0.02      Neutrophils, Absolute 4.11      Lymphocytes, Absolute 0.65 (*)      Monocytes, Absolute 0.47      Eosinophils, Absolute 0.00 (*)     Basophils, Absolute 0.01     COMPREHENSIVE METABOLIC PANEL - Abnormal    Sodium 136      Potassium 4.3      Chloride 104      CO2 22      BUN 16      Creatinine 0.7 (*)     Glucose 124 (*)     Calcium 8.5 (*)     AST (SGOT) 61 (*)     ALT (SGPT) 60      Alkaline Phosphatase 120      Total Protein 5.9 (*)     Albumin 3.1 (*)     Bilirubin, Total 1.0      eGFR >60.0      Anion Gap 10     C-REACTIVE PROTEIN - Abnormal    CRP 45.10 (*)    FERRITIN - Abnormal    Ferritin 602 (*)    LACTATE DEHYDROGENASE - Abnormal     (*)    CBC AND DIFF - Abnormal    WBC 4.25      RBC 5.07      Hemoglobin 15.2      Hematocrit 45.9      MCV 90.5      MCH 30.0      MCHC 33.1      RDW 12.4      Platelets 215      MPV 10.2      Differential Type Auto      nRBC 0.0      Immature Granulocytes 0.9      Neutrophils 73.3      Lymphocytes 16.7      Monocytes 8.9      Eosinophils 0.0      Basophils 0.2      Immature Granulocytes, Absolute 0.04      Neutrophils, Absolute 3.11      Lymphocytes, Absolute 0.71 (*)     Monocytes, Absolute 0.38      Eosinophils, Absolute 0.00 (*)     Basophils, Absolute 0.01     COMPREHENSIVE METABOLIC PANEL - Abnormal    Sodium 138      Potassium 4.6      Chloride 104      CO2 23      BUN 23 (*)     Creatinine 0.8      Glucose 127 (*)     Calcium 8.6 (*)     AST (SGOT) 46 (*)     ALT (SGPT) 62      Alkaline Phosphatase 114      Total Protein 6.0      Albumin 3.3 (*)     Bilirubin, Total 0.6      eGFR >60.0      Anion Gap 11     CBC AND DIFF - Abnormal    WBC 7.25      RBC 5.20      Hemoglobin 15.5      Hematocrit 46.9      MCV 90.2      MCH 29.8      MCHC 33.0      RDW 12.1      Platelets 249      MPV 9.7      Differential Type Auto      nRBC 0.0      Immature Granulocytes 1.0      Neutrophils 69.4      Lymphocytes 12.4      Monocytes 10.1      Eosinophils 6.5      Basophils 0.6      Immature Granulocytes, Absolute 0.07      Neutrophils, Absolute  5.04      Lymphocytes, Absolute 0.90 (*)     Monocytes, Absolute 0.73      Eosinophils, Absolute 0.47      Basophils, Absolute 0.04     COMPREHENSIVE METABOLIC PANEL - Abnormal    Sodium 137      Potassium 4.5      Chloride 102      CO2 24      BUN 23 (*)     Creatinine 0.8      Glucose 121 (*)     Calcium 8.7 (*)     AST (SGOT) 45 (*)     ALT (SGPT) 65 (*)     Alkaline Phosphatase 106      Total Protein 6.3      Albumin 3.2 (*)     Bilirubin, Total 1.0      eGFR >60.0      Anion Gap 11     C-REACTIVE PROTEIN - Abnormal    CRP 12.50 (*)    FERRITIN - Abnormal    Ferritin 610 (*)    LACTATE DEHYDROGENASE - Abnormal     (*)    CBC AND DIFF - Abnormal    WBC 8.57      RBC 5.17      Hemoglobin 15.2      Hematocrit 46.6      MCV 90.1      MCH 29.4      MCHC 32.6      RDW 12.0      Platelets 286      MPV 9.8      Differential Type Auto      nRBC 0.0      Immature Granulocytes 1.8      Neutrophils 77.7      Lymphocytes 10.9      Monocytes 8.8      Eosinophils 0.0      Basophils 0.8      Immature Granulocytes, Absolute 0.15 (*)     Neutrophils, Absolute 6.67      Lymphocytes, Absolute 0.93 (*)     Monocytes, Absolute 0.75      Eosinophils, Absolute 0.00 (*)     Basophils, Absolute 0.07     COMPREHENSIVE METABOLIC PANEL - Abnormal    Sodium 137      Potassium 4.6      Chloride 103      CO2 24      BUN 25 (*)     Creatinine 0.8      Glucose 123 (*)     Calcium 8.7 (*)     AST (SGOT) 59 (*)     ALT (SGPT) 82 (*)     Alkaline Phosphatase 95      Total Protein 6.0      Albumin 3.1 (*)     Bilirubin, Total 0.9      eGFR >60.0      Anion Gap 10     CBC AND DIFF - Abnormal    WBC 9.93      RBC 5.07      Hemoglobin 15.0      Hematocrit 45.7      MCV 90.1      MCH 29.6      MCHC 32.8      RDW 12.3      Platelets 302      MPV 9.8      Differential Type Auto      nRBC 0.0      Immature Granulocytes 3.4      Neutrophils 79.4      Lymphocytes 9.1      Monocytes 7.5      Eosinophils 0.1      Basophils 0.5      Immature Granulocytes,  Absolute 0.34 (*)     Neutrophils, Absolute 7.89 (*)     Lymphocytes, Absolute 0.90 (*)     Monocytes, Absolute 0.74      Eosinophils, Absolute 0.01 (*)     Basophils, Absolute 0.05     COMPREHENSIVE METABOLIC PANEL - Abnormal    Sodium 135 (*)     Potassium 4.8      Chloride 103      CO2 25      BUN 26 (*)     Creatinine 0.9      Glucose 129 (*)     Calcium 8.7 (*)     AST (SGOT) 44 (*)     ALT (SGPT) 81 (*)     Alkaline Phosphatase 88      Total Protein 5.9 (*)     Albumin 3.2 (*)     Bilirubin, Total 1.0      eGFR >60.0      Anion Gap 7     C-REACTIVE PROTEIN - Abnormal    CRP 8.20 (*)    FERRITIN - Abnormal    Ferritin 544 (*)    LACTATE DEHYDROGENASE - Abnormal     (*)    COMPREHENSIVE METABOLIC PANEL - Abnormal    Sodium 138      Potassium 5.2 (*)     Chloride 104      CO2 28      BUN 30 (*)     Creatinine 0.9      Glucose 95      Calcium 9.0      AST (SGOT) 46 (*)     ALT (SGPT) 76 (*)     Alkaline Phosphatase 87      Total Protein 5.7 (*)     Albumin 3.3 (*)     Bilirubin, Total 1.2      eGFR >60.0      Anion Gap 6     TROPONIN I - Normal    Troponin I <0.02     PTT - Normal    PTT 33      Narrative:     Specimen hemolyzed, clotting times may be falsely shortened     CK, TOTAL (NO REFLEX) - Normal    Total      MAGNESIUM - Normal    Magnesium 2.2     PHOSPHORUS - Normal    Phosphorus 2.7     TROPONIN I - Normal    Troponin I <0.02     D-DIMER - Normal    D-Dimer, Quant 0.41     CK, TOTAL (NO REFLEX) - Normal    Total CK 64     CK, TOTAL (NO REFLEX) - Normal    Total CK 29     D-DIMER - Normal    D-Dimer, Quant <0.27     CK, TOTAL (NO REFLEX) - Normal    Total CK 25     RAINBOW DRAW PANEL    Narrative:     The following orders were created for panel order [place] Draw Panel.  Procedure                               Abnormality         Status                     ---------                               -----------         ------                     RAINBOW RED[[id number]]                               "        Final result               RAINBOW LT BLUE[[id number]]                                  Final result               RAINBOW LT GREEN[[id number]]                                 Final result               RAINBOW GOLD[[id number]]                                     Final result               [place] Blood Culture[[id number]]                            Final result               [place] Blood Culture[[id number]]                            Final result                 Please view results for these tests on the individual orders.   RAINBOW RED   RAINBOW LT BLUE   RAINBOW LT GREEN   RAINBOW GOLD   RAINBOW BLOOD CULTURE   RAINBOW BLOOD CULTURE       X-RAY CHEST 1 VIEW   Final Result   IMPRESSION: Mild patchy bilateral airspace disease without significant change   since 07/26/2020.      COMMENT: Erect AP portable view of the chest was performed.      Comparison is made to a chest CT and chest x-ray from 07/26/2020.      Mild patchy bilateral airspace disease is again noted, right greater than left,   without significant change since the prior study.  This is better seen by CT.      No pleural effusions are seen.      The heart size is mildly prominent but unchanged.  The pulmonary vasculature is   not engorged.      The hilar and mediastinal structures are stable.      CT CHEST PULMONARY EMBOLISM WITH IV CONTRAST   Final Result   IMPRESSION:   1. No evidence for filling defect or vessel cutoff to suggest pulmonary   embolism.   2. Commonly reported imaging features of COVID-19 pneumonia are present.  Other   processes such as influenza pneumonia and organizing pneumonia, as can be seen   with drug toxicity and connective tissue disease, can cause a similar imaging   pattern. ([id number])         Peripheral, bilateral multi-lobar, GGO with or without consolidation or visible   intralobular lines (\"crazy-paving\")      Multifocal GGO of rounded morphology with or without consolidation or visible   intralobular lines " "(\"crazy-paving\")      Reverse halo sign or other findings of organizing pneumonia (seen later in the   disease)                     X-RAY CHEST 1 VIEW   Final Result   IMPRESSION: Patchy bilateral airspace opacities consistent with known covid 19   pneumonia      ECG 12 lead   Final Result                  ED Course & MDM     MDM  Number of Diagnoses or Management Options  Diagnosis management comments: Patient presenting for shortness of breath and chest pain with hypoxia at home.  Recently COVID positive.  Is on day 7 of his sickness.  In the ED he a mildly low oxygen saturation 91% on room air, mildly tachypneic, no increased work of breathing.  Will get a chest x-ray to evaluate for pulmonary edema or bilateral pneumonia.  Recent evidence suggests increase in incidence of thrombosis with COVID infection so we will get a CT PE.             ED Course as of Aug 04 1212   Sun Jul 26, 2020 2220 Bilateral groundglass opacities on CT consistent with COVID pneumonia.  Given his new oxygen requirement patient agreed for admission to [facility].  Pending CT read for PE however on review of his scan no obvious large saddle PE    [BL]      ED Course User Index  [BL] Nayeli Valdez MD         Clinical Impressions as of Aug 04 1212   Pneumonia of both lungs due to infectious organism, unspecified part of lung   COVID-19 virus infection        Nayeli Valdez MD  Resident  07/26/20 2139       Nayeli Valdez MD  Resident  08/04/20 1212    "

## 2020-07-27 NOTE — PLAN OF CARE
Problem: Adult Inpatient Plan of Care  Goal: Plan of Care Review  Flowsheets (Taken 7/26/2020 2156)  Progress: no change  Plan of Care Reviewed With: patient  Outcome Summary: Pt admitted into room 4027. Oriented to room, care routine explained.  Pt resting in bed.

## 2020-07-27 NOTE — PLAN OF CARE
Problem: Adult Inpatient Plan of Care  Goal: Plan of Care Review  Outcome: Progressing  Flowsheets (Taken 7/27/2020 8138)  Progress: improving  Plan of Care Reviewed With: patient  Outcome Summary: Patient verbalizes feeling better than this AM. Remains on 2L O2 NC, SpO2 mid-90s. Remdesivir and decadron started this afternoon.

## 2020-07-28 LAB
ALBUMIN SERPL-MCNC: 3.1 G/DL (ref 3.4–5)
ALP SERPL-CCNC: 120 IU/L (ref 35–126)
ALT SERPL-CCNC: 60 IU/L (ref 16–63)
ANION GAP SERPL CALC-SCNC: 10 MEQ/L (ref 3–15)
AST SERPL-CCNC: 61 IU/L (ref 15–41)
BASOPHILS # BLD: 0.01 K/UL (ref 0.01–0.1)
BASOPHILS NFR BLD: 0.2 %
BILIRUB SERPL-MCNC: 1 MG/DL (ref 0.3–1.2)
BUN SERPL-MCNC: 16 MG/DL (ref 8–20)
CALCIUM SERPL-MCNC: 8.5 MG/DL (ref 8.9–10.3)
CHLORIDE SERPL-SCNC: 104 MEQ/L (ref 98–109)
CK SERPL-CCNC: 64 U/L (ref 16–300)
CO2 SERPL-SCNC: 22 MEQ/L (ref 22–32)
CREAT SERPL-MCNC: 0.7 MG/DL (ref 0.8–1.3)
CRP SERPL-MCNC: 45.1 MG/L
DIFFERENTIAL METHOD BLD: ABNORMAL
EOSINOPHIL # BLD: 0 K/UL (ref 0.04–0.54)
EOSINOPHIL NFR BLD: 0 %
ERYTHROCYTE [DISTWIDTH] IN BLOOD BY AUTOMATED COUNT: 12.4 % (ref 11.6–14.4)
FERRITIN SERPL-MCNC: 602 NG/ML (ref 24–250)
GFR SERPL CREATININE-BSD FRML MDRD: >60 ML/MIN/1.73M*2
GLUCOSE SERPL-MCNC: 124 MG/DL (ref 70–99)
HCT VFR BLDCO AUTO: 44.8 % (ref 40.1–51)
HGB BLD-MCNC: 14.6 G/DL (ref 13.7–17.5)
IMM GRANULOCYTES # BLD AUTO: 0.02 K/UL (ref 0–0.08)
IMM GRANULOCYTES NFR BLD AUTO: 0.4 %
LDH SERPL L TO P-CCNC: 221 IU/L (ref 98–192)
LYMPHOCYTES # BLD: 0.65 K/UL (ref 1.2–3.5)
LYMPHOCYTES NFR BLD: 12.4 %
MCH RBC QN AUTO: 29.5 PG (ref 28–33.2)
MCHC RBC AUTO-ENTMCNC: 32.6 G/DL (ref 32.2–36.5)
MCV RBC AUTO: 90.5 FL (ref 83–98)
MONOCYTES # BLD: 0.47 K/UL (ref 0.3–1)
MONOCYTES NFR BLD: 8.9 %
NEUTROPHILS # BLD: 4.11 K/UL (ref 1.7–7)
NEUTS SEG NFR BLD: 78.1 %
NRBC BLD-RTO: 0 %
PDW BLD AUTO: 10 FL (ref 9.4–12.4)
PLATELET # BLD AUTO: 184 K/UL (ref 150–350)
POTASSIUM SERPL-SCNC: 4.3 MEQ/L (ref 3.6–5.1)
PROT SERPL-MCNC: 5.9 G/DL (ref 6–8.2)
RBC # BLD AUTO: 4.95 M/UL (ref 4.5–5.8)
SODIUM SERPL-SCNC: 136 MEQ/L (ref 136–144)
WBC # BLD AUTO: 5.26 K/UL (ref 3.8–10.5)

## 2020-07-28 PROCEDURE — 36415 COLL VENOUS BLD VENIPUNCTURE: CPT | Performed by: INTERNAL MEDICINE

## 2020-07-28 PROCEDURE — 83615 LACTATE (LD) (LDH) ENZYME: CPT | Performed by: INTERNAL MEDICINE

## 2020-07-28 PROCEDURE — 63600000 HC DRUGS/DETAIL CODE: Performed by: INTERNAL MEDICINE

## 2020-07-28 PROCEDURE — 20600000 HC ROOM AND CARE INTERMEDIATE/TELEMETRY

## 2020-07-28 PROCEDURE — 82728 ASSAY OF FERRITIN: CPT | Performed by: INTERNAL MEDICINE

## 2020-07-28 PROCEDURE — 99232 SBSQ HOSP IP/OBS MODERATE 35: CPT | Mod: CR | Performed by: INTERNAL MEDICINE

## 2020-07-28 PROCEDURE — 80053 COMPREHEN METABOLIC PANEL: CPT | Performed by: INTERNAL MEDICINE

## 2020-07-28 PROCEDURE — 25800000 HC PHARMACY IV SOLUTIONS: Performed by: INTERNAL MEDICINE

## 2020-07-28 PROCEDURE — 86140 C-REACTIVE PROTEIN: CPT | Performed by: INTERNAL MEDICINE

## 2020-07-28 PROCEDURE — 85025 COMPLETE CBC W/AUTO DIFF WBC: CPT | Performed by: INTERNAL MEDICINE

## 2020-07-28 PROCEDURE — 82550 ASSAY OF CK (CPK): CPT | Performed by: INTERNAL MEDICINE

## 2020-07-28 RX ADMIN — HEPARIN SODIUM 5000 UNITS: 5000 INJECTION, SOLUTION INTRAVENOUS; SUBCUTANEOUS at 21:09

## 2020-07-28 RX ADMIN — SODIUM CHLORIDE 100 MG: 0.9 INJECTION, SOLUTION INTRAVENOUS at 17:55

## 2020-07-28 RX ADMIN — HEPARIN SODIUM 5000 UNITS: 5000 INJECTION, SOLUTION INTRAVENOUS; SUBCUTANEOUS at 05:46

## 2020-07-28 RX ADMIN — HEPARIN SODIUM 5000 UNITS: 5000 INJECTION, SOLUTION INTRAVENOUS; SUBCUTANEOUS at 17:55

## 2020-07-28 RX ADMIN — DEXAMETHASONE SODIUM PHOSPHATE 6 MG: 4 INJECTION, SOLUTION INTRA-ARTICULAR; INTRALESIONAL; INTRAMUSCULAR; INTRAVENOUS; SOFT TISSUE at 17:55

## 2020-07-28 NOTE — PROGRESS NOTES
"   Hospital Medicine Service -  Daily Progress Note       SUBJECTIVE   Interval History: pt state that he feels better. Continues to be on o2. Pain still present but improved.      OBJECTIVE      Vital signs in last 24 hours:  Temp:  [36.8 °C (98.2 °F)-37.5 °C (99.5 °F)] 36.8 °C (98.2 °F)  Heart Rate:  [58-80] 62  Resp:  [16-20] 16  BP: (112-134)/(69-87) 112/69    Intake/Output Summary (Last 24 hours) at 7/28/2020 0718  Last data filed at 7/27/2020 1800  Gross per 24 hour   Intake 520 ml   Output --   Net 520 ml       PHYSICAL EXAMINATION      Visit Vitals  /69 (BP Location: Right upper arm)   Pulse 62   Temp 36.8 °C (98.2 °F) (Oral)   Resp 16   Ht 1.676 m (5' 6\")   Wt 85.2 kg (187 lb 14.4 oz)   SpO2 92%   BMI 30.33 kg/m²       General Appearance:  Alert, cooperative, no distress, appears stated age   Head:  Normocephalic, without obvious abnormality, atraumatic   Eyes:  PERRL, conjunctiva/corneas clear, EOM's intact, fundi benign, both eyes   Ears:  Normal TM's and external ear canals, both ears   Nose: Nares normal, septum midline,mucosa normal, no drainage or sinus tenderness   Throat: Lips, mucosa, and tongue normal; teeth and gums normal   Neck: Supple, symmetrical, trachea midline, no adenopathy;  thyroid: not enlarged, symmetric, no tenderness/mass/nodules; no carotid bruit or JVD   Back:   Symmetric, no curvature, ROM normal, no CVA tenderness   Lungs:   Clear to auscultation bilaterally, respirations unlabored   Breasts:  No masses or tenderness   Heart:  Regular rate and rhythm, S1 and S2 normal, no murmur, rub, or gallop   Abdomen:   Soft, non-tender, bowel sounds active all four quadrants,  no masses, no organomegaly   Pelvic: Deferred   Extremities: Extremities normal, atraumatic, no cyanosis or edema   Pulses: 2+ and symmetric   Skin: Skin color, texture, turgor normal, no rashes or lesions   Lymph nodes: Cervical, supraclavicular, and axillary nodes normal   Neurologic: Normal        LINES, " CATHETERS, DRAINS, AIRWAYS, AND WOUNDS   Lines, Drains, Airways, Wounds:  Peripheral IV 07/26/20 Left Antecubital (Active)   Number of days: 2       Comments:      LABS / IMAGING / TELE      Labs  CMP Results       07/28/20 07/27/20 07/26/20                    0517 0411 2037          135 135         K 4.3 5.2 4.0         Cl 104 102 99         CO2 22 24 21         Glucose 124 148 120         BUN 16 15 21         Creatinine 0.7 0.9 0.9         Calcium 8.5 8.4 8.9         Anion Gap 10 9 15         AST 61 62 80         ALT 60 54 59         Albumin 3.1 3.3 4.0         EGFR >60.0 >60.0 >60.0         Comment for K at 2037 on 07/26/20:    Results obtained on plasma. Plasma Potassium values may be up to 0.4 mEQ/L less than serum values. The differences may be greater for patients with high platelet or white cell counts.          CBC Results       07/28/20 07/27/20 07/26/20                    0517 0411 2037         WBC 5.26 3.68 4.75         RBC 4.95 5.07 5.55         HGB 14.6 14.9 16.5         HCT 44.8 46.1 50.3         MCV 90.5 90.9 90.6         MCH 29.5 29.4 29.7         MCHC 32.6 32.3 32.8          145 161         Comment for PLT at 0411 on 07/27/20:  ALL RESULTS HAVE BEEN RECHECKED. SPECIMEN QUALITY CHECKED. RESULTS OBTAINED AFTER VORTEXING TO ELIMINATE PLT CLUMPS          Imaging  X-ray Chest 1 View    Result Date: 7/27/2020  IMPRESSION: Patchy bilateral airspace opacities consistent with known covid 19 pneumonia    Ct Chest Pulmonary Embolism With Iv Contrast    Result Date: 7/26/2020  IMPRESSION: 1. No evidence for filling defect or vessel cutoff to suggest pulmonary embolism. 2. Commonly reported imaging features of COVID-19 pneumonia are present.  Other processes such as influenza pneumonia and organizing pneumonia, as can be seen with drug toxicity and connective tissue disease, can cause a similar imaging pattern. ([id number]) Peripheral, bilateral multi-lobar, GGO with or without consolidation or  "visible intralobular lines (\"crazy-paving\") Multifocal GGO of rounded morphology with or without consolidation or visible intralobular lines (\"crazy-paving\") Reverse halo sign or other findings of organizing pneumonia (seen later in the disease)       ECG/Telemetry: I have reviewed all ECGs.     ASSESSMENT AND PLAN      Hypoxia  Assessment & Plan  07/27/20  As above  o2 supplementation.     07/28/20  As above    * Pneumonia due to COVID-19 virus  Assessment & Plan  Patient was tested positive for COVID-19 for respiratory symptoms of dry cough, shortness of breath, fevers, myalgias on July 21, patient has been taking Tylenol around-the-clock, quarantining himself  Now presenting with worsening of his symptoms with no relief persistent high temperatures, shortness of breath even at rest some chest tightness intermittently when he takes deep breath, also having cough feeling inability to bring up the expectoration and has been taking Mucinex 1200 mg twice daily with no relief  Patient was hypoxic at 89% on room air on arrival improved with 2 L nasal cannula to 97%  ER work-up lab work normal, CT of the chest consistent with bilateral infiltrates  Admitting for COVID-19 pneumonia, continue with oxygen supplementation, albuterol puff inhalation  Patient also reporting  lack of appetite and decreased intake will order protein supplements.  Ordered all the markers, consulting infectious disease    07/27/20  Pt has been seen by ID. He will be started on remdesivir and steroids.   Pt is mildly hypoxic.   Symptomatic treatment.     07/28/20  Feeling better.   Continue remdesivir and decadron  Cont symptomatic tx  o2 supplementation         VTE Assessment: [place]    VTE Prophylaxis Plan:heparin  Code Status: Full Code  Estimated Discharge Date: 7/30/2020  Disposition Planning: home when stable     Radha Mayo MD  7/28/2020               "

## 2020-07-28 NOTE — PROGRESS NOTES
Major Events:  None     Subjective:  Feels unchanged    Temp:  [36.8 °C (98.2 °F)-37.5 °C (99.5 °F)] 36.8 °C (98.2 °F)  Heart Rate:  [59-80] 74  Resp:  [16-18] 18  BP: (112-133)/(69-87) 124/83     SpO2 Readings from Last 3 Encounters:   07/28/20 92%     Physical Exam:  Skin: No rash  Sclera: Anicteric  Lungs: rales bases  CV: S1, S2 nl, no embolic changes  Abd: Soft, nt, no hsm  Extr: No jt eff, no edema  Neuro: Alert, lucid    Lab Results   Component Value Date    GLUCOSE 124 (H) 07/28/2020    CALCIUM 8.5 (L) 07/28/2020     07/28/2020    K 4.3 07/28/2020    CO2 22 07/28/2020     07/28/2020    BUN 16 07/28/2020    CREATININE 0.7 (L) 07/28/2020     Lab Results   Component Value Date    WBC 5.26 07/28/2020    HGB 14.6 07/28/2020    HCT 44.8 07/28/2020    MCV 90.5 07/28/2020     07/28/2020     Lab Results   Component Value Date    ALBUMIN 3.1 (L) 07/28/2020    BILITOT 1.0 07/28/2020    ALKPHOS 120 07/28/2020    AST 61 (H) 07/28/2020    ALT 60 07/28/2020    PROTEIN 5.9 (L) 07/28/2020     Impression    COVID  Tolerating remdesivir/dex  LFTs sl better  A-a stable    YS MD Lilly  Pager [phone]

## 2020-07-28 NOTE — PATIENT CARE CONFERENCE
Care Progression Rounds Note  Date: 7/28/2020  Time: 10:33 AM     Patient Name: Jason Perez     Medical Record Number: [id number]   YOB: 1960  Sex: Male      Room/Bed: 4027    Admitting Diagnosis: Pneumonia of both lungs due to infectious organism, unspecified part of lung [J18.9]  COVID-19 virus infection [U07.1]  Pneumonia due to COVID-19 virus [U07.1, J12.89]   Admit Date/Time: 7/26/2020  8:13 PM    Primary Diagnosis: Pneumonia due to COVID-19 virus  Principal Problem: Pneumonia due to COVID-19 virus    GMLOS: pending  Anticipated Discharge Date: 8/1/2020    AM-PAC  Mobility Score: 24    Discharge Planning:  Anticipated Discharge Disposition: home with assistance, home without services    Barriers to Discharge:  Barriers to Discharge: Medical issues not resolved    Participants:  , social work/services, nursing

## 2020-07-28 NOTE — PLAN OF CARE
Problem: Adult Inpatient Plan of Care  Goal: Plan of Care Review  Outcome: Progressing  Flowsheets (Taken 7/28/2020 1214)  Progress: no change  Plan of Care Reviewed With: patient  Outcome Summary: Patient reports feeling better today and less anxious than yesterday. Still on 2L O2, SpO2 92%. Sitting up in chair, independent in room.

## 2020-07-28 NOTE — PATIENT CARE CONFERENCE
Care Progression Rounds Note  Date: 7/28/2020  Time: 10:33 AM     Patient Name: Jason Perez     Medical Record Number: [id number]   YOB: 1960  Sex: Male      Room/Bed: 4027    Admitting Diagnosis: Pneumonia of both lungs due to infectious organism, unspecified part of lung [J18.9]  COVID-19 virus infection [U07.1]  Pneumonia due to COVID-19 virus [U07.1, J12.89]   Admit Date/Time: 7/26/2020  8:13 PM    Primary Diagnosis: Pneumonia due to COVID-19 virus  Principal Problem: Pneumonia due to COVID-19 virus    GMLOS: pending  Anticipated Discharge Date: 8/1/2020    AM-PAC  Mobility Score: 24    Discharge Planning:  Anticipated Discharge Disposition: home with assistance, home without services    Barriers to Discharge:  Barriers to Discharge: Medical issues not resolved    Participants:  , social work/services, nursing

## 2020-07-28 NOTE — PLAN OF CARE
Problem: Adult Inpatient Plan of Care  Goal: Plan of Care Review  Outcome: Progressing  Flowsheets (Taken 7/28/2020 0147)  Progress: improving  Plan of Care Reviewed With: patient  Outcome Summary: Pt calm and cooperative, states anxiety has decreased, still on O2 d-sats following ambulation. Pt denies any pain or SOB. Resting in bed, refused bed alarm ambulates independently to bathroom. Will continue to monitor.  Goal: Patient-Specific Goal (Individualization)  Outcome: Progressing  Goal: Absence of Hospital-Acquired Illness or Injury  Outcome: Progressing  Goal: Optimal Comfort and Wellbeing  Outcome: Progressing  Goal: Readiness for Transition of Care  Outcome: Progressing  Goal: Rounds/Family Conference  Outcome: Progressing     Problem: Infection  Goal: Infection Symptom Resolution  Outcome: Progressing

## 2020-07-29 LAB
ALBUMIN SERPL-MCNC: 3.3 G/DL (ref 3.4–5)
ALP SERPL-CCNC: 114 IU/L (ref 35–126)
ALT SERPL-CCNC: 62 IU/L (ref 16–63)
ANION GAP SERPL CALC-SCNC: 11 MEQ/L (ref 3–15)
AST SERPL-CCNC: 46 IU/L (ref 15–41)
BASOPHILS # BLD: 0.01 K/UL (ref 0.01–0.1)
BASOPHILS NFR BLD: 0.2 %
BILIRUB SERPL-MCNC: 0.6 MG/DL (ref 0.3–1.2)
BUN SERPL-MCNC: 23 MG/DL (ref 8–20)
CALCIUM SERPL-MCNC: 8.6 MG/DL (ref 8.9–10.3)
CHLORIDE SERPL-SCNC: 104 MEQ/L (ref 98–109)
CO2 SERPL-SCNC: 23 MEQ/L (ref 22–32)
CREAT SERPL-MCNC: 0.8 MG/DL (ref 0.8–1.3)
DIFFERENTIAL METHOD BLD: ABNORMAL
EOSINOPHIL # BLD: 0 K/UL (ref 0.04–0.54)
EOSINOPHIL NFR BLD: 0 %
ERYTHROCYTE [DISTWIDTH] IN BLOOD BY AUTOMATED COUNT: 12.4 % (ref 11.6–14.4)
GFR SERPL CREATININE-BSD FRML MDRD: >60 ML/MIN/1.73M*2
GLUCOSE SERPL-MCNC: 127 MG/DL (ref 70–99)
HCT VFR BLDCO AUTO: 45.9 % (ref 40.1–51)
HGB BLD-MCNC: 15.2 G/DL (ref 13.7–17.5)
IMM GRANULOCYTES # BLD AUTO: 0.04 K/UL (ref 0–0.08)
IMM GRANULOCYTES NFR BLD AUTO: 0.9 %
LYMPHOCYTES # BLD: 0.71 K/UL (ref 1.2–3.5)
LYMPHOCYTES NFR BLD: 16.7 %
MCH RBC QN AUTO: 30 PG (ref 28–33.2)
MCHC RBC AUTO-ENTMCNC: 33.1 G/DL (ref 32.2–36.5)
MCV RBC AUTO: 90.5 FL (ref 83–98)
MONOCYTES # BLD: 0.38 K/UL (ref 0.3–1)
MONOCYTES NFR BLD: 8.9 %
NEUTROPHILS # BLD: 3.11 K/UL (ref 1.7–7)
NEUTS SEG NFR BLD: 73.3 %
NRBC BLD-RTO: 0 %
PDW BLD AUTO: 10.2 FL (ref 9.4–12.4)
PLATELET # BLD AUTO: 215 K/UL (ref 150–350)
POTASSIUM SERPL-SCNC: 4.6 MEQ/L (ref 3.6–5.1)
PROT SERPL-MCNC: 6 G/DL (ref 6–8.2)
RBC # BLD AUTO: 5.07 M/UL (ref 4.5–5.8)
SODIUM SERPL-SCNC: 138 MEQ/L (ref 136–144)
WBC # BLD AUTO: 4.25 K/UL (ref 3.8–10.5)

## 2020-07-29 PROCEDURE — 63600000 HC DRUGS/DETAIL CODE: Performed by: INTERNAL MEDICINE

## 2020-07-29 PROCEDURE — 36415 COLL VENOUS BLD VENIPUNCTURE: CPT | Performed by: INTERNAL MEDICINE

## 2020-07-29 PROCEDURE — 99232 SBSQ HOSP IP/OBS MODERATE 35: CPT | Mod: CR | Performed by: INTERNAL MEDICINE

## 2020-07-29 PROCEDURE — 85025 COMPLETE CBC W/AUTO DIFF WBC: CPT | Performed by: INTERNAL MEDICINE

## 2020-07-29 PROCEDURE — 20600000 HC ROOM AND CARE INTERMEDIATE/TELEMETRY

## 2020-07-29 PROCEDURE — 25800000 HC PHARMACY IV SOLUTIONS: Performed by: INTERNAL MEDICINE

## 2020-07-29 PROCEDURE — 80053 COMPREHEN METABOLIC PANEL: CPT | Performed by: INTERNAL MEDICINE

## 2020-07-29 RX ADMIN — DEXAMETHASONE SODIUM PHOSPHATE 6 MG: 4 INJECTION, SOLUTION INTRA-ARTICULAR; INTRALESIONAL; INTRAMUSCULAR; INTRAVENOUS; SOFT TISSUE at 14:56

## 2020-07-29 RX ADMIN — HEPARIN SODIUM 5000 UNITS: 5000 INJECTION, SOLUTION INTRAVENOUS; SUBCUTANEOUS at 14:55

## 2020-07-29 RX ADMIN — HEPARIN SODIUM 5000 UNITS: 5000 INJECTION, SOLUTION INTRAVENOUS; SUBCUTANEOUS at 05:41

## 2020-07-29 RX ADMIN — HEPARIN SODIUM 5000 UNITS: 5000 INJECTION, SOLUTION INTRAVENOUS; SUBCUTANEOUS at 22:00

## 2020-07-29 RX ADMIN — SODIUM CHLORIDE 100 MG: 0.9 INJECTION, SOLUTION INTRAVENOUS at 14:56

## 2020-07-29 NOTE — PLAN OF CARE
Problem: Adult Inpatient Plan of Care  Goal: Plan of Care Review  Outcome: Progressing  Flowsheets  Taken 7/29/2020 0342  Progress: no change  Outcome Summary: Pt rested comfortably overnight, no complaints of anxiety. vital signs stable. will continue to monitor  Taken 7/28/2020 2000  Plan of Care Reviewed With: patient  Goal: Patient-Specific Goal (Individualization)  Outcome: Progressing  Goal: Absence of Hospital-Acquired Illness or Injury  Outcome: Progressing  Goal: Optimal Comfort and Wellbeing  Outcome: Progressing  Goal: Readiness for Transition of Care  Outcome: Progressing  Goal: Rounds/Family Conference  Outcome: Progressing     Problem: Infection  Goal: Infection Symptom Resolution  Outcome: Progressing

## 2020-07-29 NOTE — PLAN OF CARE
Problem: Adult Inpatient Plan of Care  Goal: Plan of Care Review  Outcome: Progressing  Flowsheets (Taken 7/29/2020 0957)  Progress: no change  Plan of Care Reviewed With: patient  Outcome Summary: pt remains stable on 2L but in low 90s, pt feels fine, no complaints of anxiety, VSS, will monitor

## 2020-07-29 NOTE — PATIENT CARE CONFERENCE
Care Progression Rounds Note  Date: 7/29/2020  Time: 10:09 AM     Patient Name: Jason Perez     Medical Record Number: [id number]   YOB: 1960  Sex: Male      Room/Bed: 4027    Admitting Diagnosis: Pneumonia of both lungs due to infectious organism, unspecified part of lung [J18.9]  COVID-19 virus infection [U07.1]  Pneumonia due to COVID-19 virus [U07.1, J12.89]   Admit Date/Time: 7/26/2020  8:13 PM    Primary Diagnosis: Pneumonia due to COVID-19 virus  Principal Problem: Pneumonia due to COVID-19 virus    GMLOS: 5.5  Anticipated Discharge Date: 7/30/2020    AM-PAC  Mobility Score: 24    Discharge Planning:  Anticipated Discharge Disposition: home with assistance, home without services    Barriers to Discharge:  Barriers to Discharge: Medical issues not resolved    Participants:  , social work/services, nursing

## 2020-07-29 NOTE — PROGRESS NOTES
Major Events:  None     Subjective:  In chair, looks ok    Temp:  [36.3 °C (97.4 °F)-36.9 °C (98.4 °F)] 36.7 °C (98.1 °F)  Heart Rate:  [57-81] 67  Resp:  [16-20] 18  BP: (114-134)/(60-95) 114/81     SpO2 Readings from Last 3 Encounters:   07/29/20 92%     Physical Exam:  Skin: No rash  Sclera: Anicteric  Lungs: rales bases  CV: S1, S2 nl, no embolic changes  Abd: Soft, nt, no hsm  Extr: No jt eff, no edema  Neuro: Alert, lucid    Lab Results   Component Value Date    GLUCOSE 127 (H) 07/29/2020    CALCIUM 8.6 (L) 07/29/2020     07/29/2020    K 4.6 07/29/2020    CO2 23 07/29/2020     07/29/2020    BUN 23 (H) 07/29/2020    CREATININE 0.8 07/29/2020     Lab Results   Component Value Date    WBC 4.25 07/29/2020    HGB 15.2 07/29/2020    HCT 45.9 07/29/2020    MCV 90.5 07/29/2020     07/29/2020     Lab Results   Component Value Date    ALBUMIN 3.3 (L) 07/29/2020    BILITOT 0.6 07/29/2020    ALKPHOS 114 07/29/2020    AST 46 (H) 07/29/2020    ALT 62 07/29/2020    PROTEIN 6.0 07/29/2020     Impression    COVID  Tolerating remdesivir/dex, day 3/5  LFTs sl better  A-a stable  Expect improvement over next day    IVANNA Carr MD  Pager [phone]

## 2020-07-29 NOTE — PROGRESS NOTES
"   Hospital Medicine Service -  Daily Progress Note       SUBJECTIVE   Interval History: pt state that he feels better. Continues to be on o2. Pain still present but improved.      OBJECTIVE      Vital signs in last 24 hours:  Temp:  [36.3 °C (97.4 °F)-36.9 °C (98.4 °F)] 36.7 °C (98.1 °F)  Heart Rate:  [57-81] 72  Resp:  [16-20] 18  BP: (113-134)/(60-95) 113/77    Intake/Output Summary (Last 24 hours) at 7/29/2020 1320  Last data filed at 7/28/2020 1800  Gross per 24 hour   Intake 360 ml   Output --   Net 360 ml       PHYSICAL EXAMINATION      Visit Vitals  /77 (BP Location: Right upper arm, Patient Position: Sitting)   Pulse 72   Temp 36.7 °C (98.1 °F) (Oral)   Resp 18   Ht 1.676 m (5' 6\")   Wt 85.2 kg (187 lb 14.4 oz)   SpO2 94%   BMI 30.33 kg/m²       General Appearance:  Alert, cooperative, no distress, appears stated age   Head:  Normocephalic, without obvious abnormality, atraumatic   Eyes:  PERRL, conjunctiva/corneas clear, EOM's intact, fundi benign, both eyes   Ears:  Normal TM's and external ear canals, both ears   Nose: Nares normal, septum midline,mucosa normal, no drainage or sinus tenderness   Throat: Lips, mucosa, and tongue normal; teeth and gums normal   Neck: Supple, symmetrical, trachea midline, no adenopathy;  thyroid: not enlarged, symmetric, no tenderness/mass/nodules; no carotid bruit or JVD   Back:   Symmetric, no curvature, ROM normal, no CVA tenderness   Lungs:   Crackles at the bases.    Breasts:  No masses or tenderness   Heart:  Regular rate and rhythm, S1 and S2 normal, no murmur, rub, or gallop   Abdomen:   Soft, non-tender, bowel sounds active all four quadrants,  no masses, no organomegaly   Pelvic: Deferred   Extremities: Extremities normal, atraumatic, no cyanosis or edema   Pulses: 2+ and symmetric   Skin: Skin color, texture, turgor normal, no rashes or lesions   Lymph nodes: Cervical, supraclavicular, and axillary nodes normal   Neurologic: Normal        LINES, CATHETERS, " DRAINS, AIRWAYS, AND WOUNDS   Lines, Drains, Airways, Wounds:  Peripheral IV 07/26/20 Left Antecubital (Active)   Number of days: 2       Comments:      LABS / IMAGING / TELE      Labs  CMP Results       07/28/20 07/27/20 07/26/20                    0517 0411 2037          135 135         K 4.3 5.2 4.0         Cl 104 102 99         CO2 22 24 21         Glucose 124 148 120         BUN 16 15 21         Creatinine 0.7 0.9 0.9         Calcium 8.5 8.4 8.9         Anion Gap 10 9 15         AST 61 62 80         ALT 60 54 59         Albumin 3.1 3.3 4.0         EGFR >60.0 >60.0 >60.0         Comment for K at 2037 on 07/26/20:    Results obtained on plasma. Plasma Potassium values may be up to 0.4 mEQ/L less than serum values. The differences may be greater for patients with high platelet or white cell counts.          CBC Results       07/28/20 07/27/20 07/26/20                    0517 0411 2037         WBC 5.26 3.68 4.75         RBC 4.95 5.07 5.55         HGB 14.6 14.9 16.5         HCT 44.8 46.1 50.3         MCV 90.5 90.9 90.6         MCH 29.5 29.4 29.7         MCHC 32.6 32.3 32.8          145 161         Comment for PLT at 0411 on 07/27/20:  ALL RESULTS HAVE BEEN RECHECKED. SPECIMEN QUALITY CHECKED. RESULTS OBTAINED AFTER VORTEXING TO ELIMINATE PLT CLUMPS          Imaging  X-ray Chest 1 View    Result Date: 7/27/2020  IMPRESSION: Patchy bilateral airspace opacities consistent with known covid 19 pneumonia    Ct Chest Pulmonary Embolism With Iv Contrast    Result Date: 7/26/2020  IMPRESSION: 1. No evidence for filling defect or vessel cutoff to suggest pulmonary embolism. 2. Commonly reported imaging features of COVID-19 pneumonia are present.  Other processes such as influenza pneumonia and organizing pneumonia, as can be seen with drug toxicity and connective tissue disease, can cause a similar imaging pattern. ([id number]) Peripheral, bilateral multi-lobar, GGO with or without consolidation or visible  "intralobular lines (\"crazy-paving\") Multifocal GGO of rounded morphology with or without consolidation or visible intralobular lines (\"crazy-paving\") Reverse halo sign or other findings of organizing pneumonia (seen later in the disease)       ECG/Telemetry: I have reviewed all ECGs.     ASSESSMENT AND PLAN      Hypoxia  Assessment & Plan  07/27/20  As above  o2 supplementation.     07/28/20  As above    07/29/20  As above    * Pneumonia due to COVID-19 virus  Assessment & Plan  Patient was tested positive for COVID-19 for respiratory symptoms of dry cough, shortness of breath, fevers, myalgias on July 21, patient has been taking Tylenol around-the-clock, quarantining himself  Now presenting with worsening of his symptoms with no relief persistent high temperatures, shortness of breath even at rest some chest tightness intermittently when he takes deep breath, also having cough feeling inability to bring up the expectoration and has been taking Mucinex 1200 mg twice daily with no relief  Patient was hypoxic at 89% on room air on arrival improved with 2 L nasal cannula to 97%  ER work-up lab work normal, CT of the chest consistent with bilateral infiltrates  Admitting for COVID-19 pneumonia, continue with oxygen supplementation, albuterol puff inhalation  Patient also reporting  lack of appetite and decreased intake will order protein supplements.  Ordered all the markers, consulting infectious disease    07/27/20  Pt has been seen by ID. He will be started on remdesivir and steroids.   Pt is mildly hypoxic.   Symptomatic treatment.     07/28/20  Feeling better.   Continue remdesivir and decadron  Cont symptomatic tx  o2 supplementation    07/29/20  As above       VTE Assessment: Padua    VTE Prophylaxis Plan:heparin  Code Status: Full Code  Estimated Discharge Date:8/1/20  Disposition Planning: home when stable     Radha Mayo MD  7/29/2020               "

## 2020-07-30 ENCOUNTER — APPOINTMENT (INPATIENT)
Dept: RADIOLOGY | Facility: HOSPITAL | Age: 60
DRG: 177 | End: 2020-07-30
Attending: INTERNAL MEDICINE
Payer: COMMERCIAL

## 2020-07-30 LAB
ALBUMIN SERPL-MCNC: 3.2 G/DL (ref 3.4–5)
ALP SERPL-CCNC: 106 IU/L (ref 35–126)
ALT SERPL-CCNC: 65 IU/L (ref 16–63)
ANION GAP SERPL CALC-SCNC: 11 MEQ/L (ref 3–15)
AST SERPL-CCNC: 45 IU/L (ref 15–41)
BASOPHILS # BLD: 0.04 K/UL (ref 0.01–0.1)
BASOPHILS NFR BLD: 0.6 %
BILIRUB SERPL-MCNC: 1 MG/DL (ref 0.3–1.2)
BUN SERPL-MCNC: 23 MG/DL (ref 8–20)
CALCIUM SERPL-MCNC: 8.7 MG/DL (ref 8.9–10.3)
CHLORIDE SERPL-SCNC: 102 MEQ/L (ref 98–109)
CK SERPL-CCNC: 29 U/L (ref 16–300)
CO2 SERPL-SCNC: 24 MEQ/L (ref 22–32)
CREAT SERPL-MCNC: 0.8 MG/DL (ref 0.8–1.3)
CRP SERPL-MCNC: 12.5 MG/L
DIFFERENTIAL METHOD BLD: ABNORMAL
EOSINOPHIL # BLD: 0.47 K/UL (ref 0.04–0.54)
EOSINOPHIL NFR BLD: 6.5 %
ERYTHROCYTE [DISTWIDTH] IN BLOOD BY AUTOMATED COUNT: 12.1 % (ref 11.6–14.4)
FERRITIN SERPL-MCNC: 610 NG/ML (ref 24–250)
GFR SERPL CREATININE-BSD FRML MDRD: >60 ML/MIN/1.73M*2
GLUCOSE SERPL-MCNC: 121 MG/DL (ref 70–99)
HCT VFR BLDCO AUTO: 46.9 % (ref 40.1–51)
HGB BLD-MCNC: 15.5 G/DL (ref 13.7–17.5)
IMM GRANULOCYTES # BLD AUTO: 0.07 K/UL (ref 0–0.08)
IMM GRANULOCYTES NFR BLD AUTO: 1 %
LDH SERPL L TO P-CCNC: 204 IU/L (ref 98–192)
LYMPHOCYTES # BLD: 0.9 K/UL (ref 1.2–3.5)
LYMPHOCYTES NFR BLD: 12.4 %
MCH RBC QN AUTO: 29.8 PG (ref 28–33.2)
MCHC RBC AUTO-ENTMCNC: 33 G/DL (ref 32.2–36.5)
MCV RBC AUTO: 90.2 FL (ref 83–98)
MONOCYTES # BLD: 0.73 K/UL (ref 0.3–1)
MONOCYTES NFR BLD: 10.1 %
NEUTROPHILS # BLD: 5.04 K/UL (ref 1.7–7)
NEUTS SEG NFR BLD: 69.4 %
NRBC BLD-RTO: 0 %
PDW BLD AUTO: 9.7 FL (ref 9.4–12.4)
PLATELET # BLD AUTO: 249 K/UL (ref 150–350)
POTASSIUM SERPL-SCNC: 4.5 MEQ/L (ref 3.6–5.1)
PROT SERPL-MCNC: 6.3 G/DL (ref 6–8.2)
RBC # BLD AUTO: 5.2 M/UL (ref 4.5–5.8)
SODIUM SERPL-SCNC: 137 MEQ/L (ref 136–144)
WBC # BLD AUTO: 7.25 K/UL (ref 3.8–10.5)

## 2020-07-30 PROCEDURE — 63600000 HC DRUGS/DETAIL CODE: Performed by: INTERNAL MEDICINE

## 2020-07-30 PROCEDURE — 25800000 HC PHARMACY IV SOLUTIONS: Performed by: INTERNAL MEDICINE

## 2020-07-30 PROCEDURE — 82550 ASSAY OF CK (CPK): CPT | Performed by: INTERNAL MEDICINE

## 2020-07-30 PROCEDURE — 99232 SBSQ HOSP IP/OBS MODERATE 35: CPT | Mod: CR | Performed by: INTERNAL MEDICINE

## 2020-07-30 PROCEDURE — 82728 ASSAY OF FERRITIN: CPT | Performed by: INTERNAL MEDICINE

## 2020-07-30 PROCEDURE — 83615 LACTATE (LD) (LDH) ENZYME: CPT | Performed by: INTERNAL MEDICINE

## 2020-07-30 PROCEDURE — 85025 COMPLETE CBC W/AUTO DIFF WBC: CPT | Performed by: INTERNAL MEDICINE

## 2020-07-30 PROCEDURE — 86140 C-REACTIVE PROTEIN: CPT | Performed by: INTERNAL MEDICINE

## 2020-07-30 PROCEDURE — 63600000 HC DRUGS/DETAIL CODE: Mod: JW | Performed by: INTERNAL MEDICINE

## 2020-07-30 PROCEDURE — 71045 X-RAY EXAM CHEST 1 VIEW: CPT

## 2020-07-30 PROCEDURE — 63700000 HC SELF-ADMINISTRABLE DRUG: Performed by: INTERNAL MEDICINE

## 2020-07-30 PROCEDURE — 20600000 HC ROOM AND CARE INTERMEDIATE/TELEMETRY

## 2020-07-30 PROCEDURE — 80053 COMPREHEN METABOLIC PANEL: CPT | Performed by: INTERNAL MEDICINE

## 2020-07-30 RX ORDER — LORAZEPAM 0.5 MG/1
0.25 TABLET ORAL ONCE
Status: COMPLETED | OUTPATIENT
Start: 2020-07-30 | End: 2020-07-30

## 2020-07-30 RX ADMIN — LORAZEPAM 0.25 MG: 0.5 TABLET ORAL at 18:17

## 2020-07-30 RX ADMIN — HEPARIN SODIUM 5000 UNITS: 5000 INJECTION, SOLUTION INTRAVENOUS; SUBCUTANEOUS at 06:15

## 2020-07-30 RX ADMIN — HEPARIN SODIUM 5000 UNITS: 5000 INJECTION, SOLUTION INTRAVENOUS; SUBCUTANEOUS at 15:11

## 2020-07-30 RX ADMIN — DEXAMETHASONE SODIUM PHOSPHATE 6 MG: 4 INJECTION, SOLUTION INTRA-ARTICULAR; INTRALESIONAL; INTRAMUSCULAR; INTRAVENOUS; SOFT TISSUE at 15:11

## 2020-07-30 RX ADMIN — SODIUM CHLORIDE 100 MG: 0.9 INJECTION, SOLUTION INTRAVENOUS at 15:11

## 2020-07-30 RX ADMIN — HEPARIN SODIUM 5000 UNITS: 5000 INJECTION, SOLUTION INTRAVENOUS; SUBCUTANEOUS at 21:52

## 2020-07-30 NOTE — PLAN OF CARE
Problem: Adult Inpatient Plan of Care  Goal: Plan of Care Review  Outcome: Progressing  Flowsheets (Taken 7/30/2020 4645)  Progress: no change  Plan of Care Reviewed With: patient  Outcome Summary: No complaints overnight. Remains on 2L O2 via NC, pulse ox in the mid 90s. Will continue to monitor.

## 2020-07-30 NOTE — PLAN OF CARE
Problem: Adult Inpatient Plan of Care  Goal: Plan of Care Review  Outcome: Progressing  Flowsheets (Taken 7/30/2020 4364)  Progress: no change  Plan of Care Reviewed With: patient  Outcome Summary: No complaints, pt remains on 2L O2, independent in room. will monitor

## 2020-07-30 NOTE — PROGRESS NOTES
"   Hospital Medicine Service -  Daily Progress Note       SUBJECTIVE   Interval History: No acute events overnight. No complaints this morning other than shortness of breath. Needs o2. No pain. No fever overnignt.      OBJECTIVE      Vital signs in last 24 hours:  Temp:  [36.6 °C (97.8 °F)-37 °C (98.6 °F)] 36.7 °C (98.1 °F)  Heart Rate:  [51-78] 63  Resp:  [16] 16  BP: (119-132)/(61-81) 119/61  No intake or output data in the 24 hours ending 07/30/20 1432    PHYSICAL EXAMINATION      Visit Vitals  /61 (BP Location: Right upper arm, Patient Position: Lying)   Pulse 63   Temp 36.7 °C (98.1 °F) (Oral)   Resp 16   Ht 1.676 m (5' 6\")   Wt 85.2 kg (187 lb 14.4 oz)   SpO2 93%   BMI 30.33 kg/m²       General Appearance:  Alert, cooperative, no distress, appears stated age   Head:  Normocephalic, without obvious abnormality, atraumatic   Eyes:  PERRL, conjunctiva/corneas clear, EOM's intact, fundi benign, both eyes   Ears:  Normal TM's and external ear canals, both ears   Nose: Nares normal, septum midline,mucosa normal, no drainage or sinus tenderness   Throat: Lips, mucosa, and tongue normal; teeth and gums normal   Neck: Supple, symmetrical, trachea midline, no adenopathy;  thyroid: not enlarged, symmetric, no tenderness/mass/nodules; no carotid bruit or JVD   Back:   Symmetric, no curvature, ROM normal, no CVA tenderness   Lungs:   Crackles at the bases   Breasts:  No masses or tenderness   Heart:  Regular rate and rhythm, S1 and S2 normal, no murmur, rub, or gallop   Abdomen:   Soft, non-tender, bowel sounds active all four quadrants,  no masses, no organomegaly   Pelvic: Deferred   Extremities: Extremities normal, atraumatic, no cyanosis or edema   Pulses: 2+ and symmetric   Skin: Skin color, texture, turgor normal, no rashes or lesions   Lymph nodes: Cervical, supraclavicular, and axillary nodes normal   Neurologic: Normal        LINES, CATHETERS, DRAINS, AIRWAYS, AND WOUNDS   Lines, Drains, Airways, " Wounds:  Peripheral IV 07/26/20 Left Antecubital (Active)   Number of days: 4       Comments:      LABS / IMAGING / TELE      Labs  CMP Results       07/30/20 07/29/20 07/28/20                    0627 0540 0517          138 136         K 4.5 4.6 4.3         Cl 102 104 104         CO2 24 23 22         Glucose 121 127 124         BUN 23 23 16         Creatinine 0.8 0.8 0.7         Calcium 8.7 8.6 8.5         Anion Gap 11 11 10         AST 45 46 61         ALT 65 62 60         Albumin 3.2 3.3 3.1         EGFR >60.0 >60.0 >60.0                       CBC Results       07/30/20 07/29/20 07/28/20 0627 0540 0517         WBC 7.25 4.25 5.26         RBC 5.20 5.07 4.95         HGB 15.5 15.2 14.6         HCT 46.9 45.9 44.8         MCV 90.2 90.5 90.5         MCH 29.8 30.0 29.5         MCHC 33.0 33.1 32.6          215 184                       Imaging  X-ray Chest 1 View    Result Date: 7/30/2020  IMPRESSION: Mild patchy bilateral airspace disease without significant change since 07/26/2020. COMMENT: Erect AP portable view of the chest was performed. Comparison is made to a chest CT and chest x-ray from 07/26/2020. Mild patchy bilateral airspace disease is again noted, right greater than left, without significant change since the prior study.  This is better seen by CT. No pleural effusions are seen. The heart size is mildly prominent but unchanged.  The pulmonary vasculature is not engorged. The hilar and mediastinal structures are stable.    X-ray Chest 1 View    Result Date: 7/27/2020  IMPRESSION: Patchy bilateral airspace opacities consistent with known covid 19 pneumonia    Ct Chest Pulmonary Embolism With Iv Contrast    Result Date: 7/26/2020  IMPRESSION: 1. No evidence for filling defect or vessel cutoff to suggest pulmonary embolism. 2. Commonly reported imaging features of COVID-19 pneumonia are present.  Other processes such as influenza pneumonia and organizing pneumonia, as can be seen  "with drug toxicity and connective tissue disease, can cause a similar imaging pattern. ([id number]) Peripheral, bilateral multi-lobar, GGO with or without consolidation or visible intralobular lines (\"crazy-paving\") Multifocal GGO of rounded morphology with or without consolidation or visible intralobular lines (\"crazy-paving\") Reverse halo sign or other findings of organizing pneumonia (seen later in the disease)       ECG/Telemetry: I have reviewed all ECGs.     ASSESSMENT AND PLAN      Hypoxia  Assessment & Plan  07/27/20  As above  o2 supplementation.     07/28/20  As above    07/30/20  As above    * Pneumonia due to COVID-19 virus  Assessment & Plan  Patient was tested positive for COVID-19 for respiratory symptoms of dry cough, shortness of breath, fevers, myalgias on July 21, patient has been taking Tylenol around-the-clock, quarantining himself  Now presenting with worsening of his symptoms with no relief persistent high temperatures, shortness of breath even at rest some chest tightness intermittently when he takes deep breath, also having cough feeling inability to bring up the expectoration and has been taking Mucinex 1200 mg twice daily with no relief  Patient was hypoxic at 89% on room air on arrival improved with 2 L nasal cannula to 97%  ER work-up lab work normal, CT of the chest consistent with bilateral infiltrates  Admitting for COVID-19 pneumonia, continue with oxygen supplementation, albuterol puff inhalation  Patient also reporting  lack of appetite and decreased intake will order protein supplements.  Ordered all the markers, consulting infectious disease    07/27/20  Pt has been seen by ID. He will be started on remdesivir and steroids.   Pt is mildly hypoxic.   Symptomatic treatment.     07/28/20  Feeling better.   Continue remdesivir and decadron  Cont symptomatic tx  o2 supplementation    07/30/20  Cont symptomatic tx  o2 supplementation  Will complete 5 days of steroids and remdesivir.      "     VTE Assessment: Padua    VTE Prophylaxis Plan: heparin  Code Status: Full Code  Estimated Discharge Date: 7/31/2020  Disposition Planning: dc home when stable     Radha Mayo MD  7/30/2020

## 2020-07-30 NOTE — PLAN OF CARE
Problem: Adult Inpatient Plan of Care  Goal: Plan of Care Review  Outcome: Progressing     Chart reviewed, pt is not medically stable for discharge, still on Remdesivir and 2L NC.  Will continue to follow for discharge needs.  Monserrat Hills RN

## 2020-07-30 NOTE — PATIENT CARE CONFERENCE
Care Progression Rounds Note  Date: 7/30/2020  Time: 10:22 AM     Patient Name: Jason Perez     Medical Record Number: [id number]   YOB: 1960  Sex: Male      Room/Bed: 4027    Admitting Diagnosis: Pneumonia of both lungs due to infectious organism, unspecified part of lung [J18.9]  COVID-19 virus infection [U07.1]  Pneumonia due to COVID-19 virus [U07.1, J12.89]   Admit Date/Time: 7/26/2020  8:13 PM    Primary Diagnosis: Pneumonia due to COVID-19 virus  Principal Problem: Pneumonia due to COVID-19 virus    GMLOS: 5.5  Anticipated Discharge Date: 7/31/2020    AM-PAC  Mobility Score: 24    Discharge Planning:  Anticipated Discharge Disposition: home with assistance, home without services    Barriers to Discharge:  Barriers to Discharge: Medical issues not resolved    Participants:  , social work/services, nursing

## 2020-07-30 NOTE — PROGRESS NOTES
Major Events:  None     Subjective:  In chair, looks ok    Temp:  [36.6 °C (97.8 °F)-37 °C (98.6 °F)] 36.6 °C (97.8 °F)  Heart Rate:  [51-78] 60  Resp:  [16-18] 16  BP: (113-132)/(72-81) 132/72     SpO2 Readings from Last 3 Encounters:   07/30/20 92%     Physical Exam:  Skin: No rash  Sclera: Anicteric  Lungs: rales bases  CV: S1, S2 nl, no embolic changes  Abd: Soft, nt, no hsm  Extr: No jt eff, no edema  Neuro: Alert, lucid    Lab Results   Component Value Date    GLUCOSE 121 (H) 07/30/2020    CALCIUM 8.7 (L) 07/30/2020     07/30/2020    K 4.5 07/30/2020    CO2 24 07/30/2020     07/30/2020    BUN 23 (H) 07/30/2020    CREATININE 0.8 07/30/2020     Lab Results   Component Value Date    WBC 7.25 07/30/2020    HGB 15.5 07/30/2020    HCT 46.9 07/30/2020    MCV 90.2 07/30/2020     07/30/2020     Lab Results   Component Value Date    ALBUMIN 3.2 (L) 07/30/2020    BILITOT 1.0 07/30/2020    ALKPHOS 106 07/30/2020    AST 45 (H) 07/30/2020    ALT 65 (H) 07/30/2020    PROTEIN 6.3 07/30/2020     Imaging    indep rev    CXR today  Largely the same    Impression    COVID  Tolerating remdesivir/dex, day 4/5  LFTs sl better  A-a stable/ better  Expect improvement over next day    IVANNA Carr MD  Pager [phone]

## 2020-07-31 LAB
ALBUMIN SERPL-MCNC: 3.1 G/DL (ref 3.4–5)
ALP SERPL-CCNC: 95 IU/L (ref 35–126)
ALT SERPL-CCNC: 82 IU/L (ref 16–63)
ANION GAP SERPL CALC-SCNC: 10 MEQ/L (ref 3–15)
AST SERPL-CCNC: 59 IU/L (ref 15–41)
BASOPHILS # BLD: 0.07 K/UL (ref 0.01–0.1)
BASOPHILS NFR BLD: 0.8 %
BILIRUB SERPL-MCNC: 0.9 MG/DL (ref 0.3–1.2)
BUN SERPL-MCNC: 25 MG/DL (ref 8–20)
CALCIUM SERPL-MCNC: 8.7 MG/DL (ref 8.9–10.3)
CHLORIDE SERPL-SCNC: 103 MEQ/L (ref 98–109)
CO2 SERPL-SCNC: 24 MEQ/L (ref 22–32)
CREAT SERPL-MCNC: 0.8 MG/DL (ref 0.8–1.3)
D DIMER PPP IA.FEU-MCNC: <0.27 UG/ML FEU (ref 0–0.5)
DIFFERENTIAL METHOD BLD: ABNORMAL
EOSINOPHIL # BLD: 0 K/UL (ref 0.04–0.54)
EOSINOPHIL NFR BLD: 0 %
ERYTHROCYTE [DISTWIDTH] IN BLOOD BY AUTOMATED COUNT: 12 % (ref 11.6–14.4)
GFR SERPL CREATININE-BSD FRML MDRD: >60 ML/MIN/1.73M*2
GLUCOSE SERPL-MCNC: 123 MG/DL (ref 70–99)
HCT VFR BLDCO AUTO: 46.6 % (ref 40.1–51)
HGB BLD-MCNC: 15.2 G/DL (ref 13.7–17.5)
IMM GRANULOCYTES # BLD AUTO: 0.15 K/UL (ref 0–0.08)
IMM GRANULOCYTES NFR BLD AUTO: 1.8 %
LYMPHOCYTES # BLD: 0.93 K/UL (ref 1.2–3.5)
LYMPHOCYTES NFR BLD: 10.9 %
MCH RBC QN AUTO: 29.4 PG (ref 28–33.2)
MCHC RBC AUTO-ENTMCNC: 32.6 G/DL (ref 32.2–36.5)
MCV RBC AUTO: 90.1 FL (ref 83–98)
MONOCYTES # BLD: 0.75 K/UL (ref 0.3–1)
MONOCYTES NFR BLD: 8.8 %
NEUTROPHILS # BLD: 6.67 K/UL (ref 1.7–7)
NEUTS SEG NFR BLD: 77.7 %
NRBC BLD-RTO: 0 %
PDW BLD AUTO: 9.8 FL (ref 9.4–12.4)
PLATELET # BLD AUTO: 286 K/UL (ref 150–350)
POTASSIUM SERPL-SCNC: 4.6 MEQ/L (ref 3.6–5.1)
PROT SERPL-MCNC: 6 G/DL (ref 6–8.2)
RBC # BLD AUTO: 5.17 M/UL (ref 4.5–5.8)
SODIUM SERPL-SCNC: 137 MEQ/L (ref 136–144)
WBC # BLD AUTO: 8.57 K/UL (ref 3.8–10.5)

## 2020-07-31 PROCEDURE — 85379 FIBRIN DEGRADATION QUANT: CPT | Performed by: INTERNAL MEDICINE

## 2020-07-31 PROCEDURE — 63700000 HC SELF-ADMINISTRABLE DRUG: Performed by: INTERNAL MEDICINE

## 2020-07-31 PROCEDURE — 85025 COMPLETE CBC W/AUTO DIFF WBC: CPT | Performed by: INTERNAL MEDICINE

## 2020-07-31 PROCEDURE — 63600000 HC DRUGS/DETAIL CODE: Performed by: INTERNAL MEDICINE

## 2020-07-31 PROCEDURE — 20600000 HC ROOM AND CARE INTERMEDIATE/TELEMETRY

## 2020-07-31 PROCEDURE — 80053 COMPREHEN METABOLIC PANEL: CPT | Performed by: INTERNAL MEDICINE

## 2020-07-31 PROCEDURE — 36415 COLL VENOUS BLD VENIPUNCTURE: CPT | Performed by: INTERNAL MEDICINE

## 2020-07-31 PROCEDURE — 99232 SBSQ HOSP IP/OBS MODERATE 35: CPT | Mod: CR | Performed by: INTERNAL MEDICINE

## 2020-07-31 PROCEDURE — 25800000 HC PHARMACY IV SOLUTIONS: Performed by: INTERNAL MEDICINE

## 2020-07-31 RX ORDER — DEXAMETHASONE 4 MG/1
6 TABLET ORAL ONCE
Status: DISCONTINUED | OUTPATIENT
Start: 2020-07-31 | End: 2020-07-31

## 2020-07-31 RX ADMIN — HEPARIN SODIUM 5000 UNITS: 5000 INJECTION, SOLUTION INTRAVENOUS; SUBCUTANEOUS at 14:57

## 2020-07-31 RX ADMIN — HEPARIN SODIUM 5000 UNITS: 5000 INJECTION, SOLUTION INTRAVENOUS; SUBCUTANEOUS at 21:25

## 2020-07-31 RX ADMIN — HEPARIN SODIUM 5000 UNITS: 5000 INJECTION, SOLUTION INTRAVENOUS; SUBCUTANEOUS at 05:41

## 2020-07-31 RX ADMIN — DEXAMETHASONE 6 MG: 2 TABLET ORAL at 14:57

## 2020-07-31 RX ADMIN — SODIUM CHLORIDE 100 MG: 0.9 INJECTION, SOLUTION INTRAVENOUS at 14:58

## 2020-07-31 NOTE — PLAN OF CARE
"  Problem: Adult Inpatient Plan of Care  Goal: Plan of Care Review  Outcome: Progressing  Flowsheets (Taken 7/31/2020 1317)  Progress: improving  Plan of Care Reviewed With: patient  Outcome Summary: Patient OOB to chair. O2/2L. Washed  up and given breathing exercises to reduce anxiety. Much anxiety regarding Covid 19 and concern that he will \"relapse\".  Much support offered by HMS, ID and RN. Educated patient regarding proning while in bed. Patient agreeable to try. Patient proned for 2 hours and achieved between 99 and 100% on RA. Patient very happy with results. Appetite improving. Last dose remdesivir due today. Decadron route changed from IV to PO. Call bell in reach.     "

## 2020-07-31 NOTE — PATIENT CARE CONFERENCE
Care Progression Rounds Note  Date: 7/31/2020  Time: 10:36 AM     Patient Name: Jason Perez     Medical Record Number: [id number]   YOB: 1960  Sex: Male      Room/Bed: 4027    Admitting Diagnosis: Pneumonia of both lungs due to infectious organism, unspecified part of lung [J18.9]  COVID-19 virus infection [U07.1]  Pneumonia due to COVID-19 virus [U07.1, J12.89]   Admit Date/Time: 7/26/2020  8:13 PM    Primary Diagnosis: Pneumonia due to COVID-19 virus  Principal Problem: Pneumonia due to COVID-19 virus    GMLOS: 5.5  Anticipated Discharge Date: 8/3/2020    AM-PAC  Mobility Score: 24    Discharge Planning:  Anticipated Discharge Disposition: home with assistance, home without services    Barriers to Discharge:  Barriers to Discharge: Medical issues not resolved    Participants:  , social work/services, nursing

## 2020-07-31 NOTE — PROGRESS NOTES
"   Hospital Medicine Service -  Daily Progress Note       SUBJECTIVE   Interval History: Patient has been very anxious.  Required Ativan last night due to anxiety related to his medical condition.  The patient also is nervous about x-ray findings.  We have reassured him that unfortunately this  disease takes time and that he is improving slowly.  The patient still have his oxygen requirement but he has been afebrile.  Denies chest pain.  Denies back pain.  He is tolerating p.o. no diarrhea.  He is on day 4 out of 5 of treatment with remdesivir and steroids   OBJECTIVE      Vital signs in last 24 hours:  Temp:  [36.7 °C (98 °F)-36.8 °C (98.3 °F)] 36.8 °C (98.3 °F)  Heart Rate:  [53-85] 53  Resp:  [16] 16  BP: (119-138)/(61-88) 138/88  No intake or output data in the 24 hours ending 07/31/20 1156    PHYSICAL EXAMINATION      Visit Vitals  /88 (BP Location: Right upper arm, Patient Position: Lying)   Pulse (!) 53   Temp 36.8 °C (98.3 °F) (Oral)   Resp 16   Ht 1.676 m (5' 6\")   Wt 85.2 kg (187 lb 14.4 oz)   SpO2 94%   BMI 30.33 kg/m²       General Appearance:  Alert, cooperative, no distress, appears stated age   Head:  Normocephalic, without obvious abnormality, atraumatic   Eyes:  PERRL, conjunctiva/corneas clear, EOM's intact, fundi benign, both eyes   Ears:  Normal TM's and external ear canals, both ears   Nose: Nares normal, septum midline,mucosa normal, no drainage or sinus tenderness   Throat: Lips, mucosa, and tongue normal; teeth and gums normal   Neck: Supple, symmetrical, trachea midline, no adenopathy;  thyroid: not enlarged, symmetric, no tenderness/mass/nodules; no carotid bruit or JVD   Back:   Symmetric, no curvature, ROM normal, no CVA tenderness   Lungs:   crackles at the bases unchanged   Breasts:  No masses or tenderness   Heart:  Regular rate and rhythm, S1 and S2 normal, no murmur, rub, or gallop   Abdomen:   Soft, non-tender, bowel sounds active all four quadrants,  no masses, no " organomegaly   Pelvic: Deferred   Extremities: Extremities normal, atraumatic, no cyanosis or edema   Pulses: 2+ and symmetric   Skin: Skin color, texture, turgor normal, no rashes or lesions   Lymph nodes: Cervical, supraclavicular, and axillary nodes normal   Neurologic: Normal        LINES, CATHETERS, DRAINS, AIRWAYS, AND WOUNDS   Lines, Drains, Airways, Wounds:  Peripheral IV 07/26/20 Left Antecubital (Active)   Number of days: 5       Comments:      LABS / IMAGING / TELE      Labs  CMP Results       07/31/20 07/30/20 07/29/20                    0544 0627 0540          137 138         K 4.6 4.5 4.6         Cl 103 102 104         CO2 24 24 23         Glucose 123 121 127         BUN 25 23 23         Creatinine 0.8 0.8 0.8         Calcium 8.7 8.7 8.6         Anion Gap 10 11 11         AST 59 45 46         ALT 82 65 62         Albumin 3.1 3.2 3.3         EGFR >60.0 >60.0 >60.0                       CBC Results       07/31/20 07/30/20 07/29/20                    0544 0627 0540         WBC 8.57 7.25 4.25         RBC 5.17 5.20 5.07         HGB 15.2 15.5 15.2         HCT 46.6 46.9 45.9         MCV 90.1 90.2 90.5         MCH 29.4 29.8 30.0         MCHC 32.6 33.0 33.1          249 215                       Imaging  X-ray Chest 1 View    Result Date: 7/30/2020  IMPRESSION: Mild patchy bilateral airspace disease without significant change since 07/26/2020. COMMENT: Erect AP portable view of the chest was performed. Comparison is made to a chest CT and chest x-ray from 07/26/2020. Mild patchy bilateral airspace disease is again noted, right greater than left, without significant change since the prior study.  This is better seen by CT. No pleural effusions are seen. The heart size is mildly prominent but unchanged.  The pulmonary vasculature is not engorged. The hilar and mediastinal structures are stable.    X-ray Chest 1 View    Result Date: 7/27/2020  IMPRESSION: Patchy bilateral airspace opacities consistent  "with known covid 19 pneumonia    Ct Chest Pulmonary Embolism With Iv Contrast    Result Date: 7/26/2020  IMPRESSION: 1. No evidence for filling defect or vessel cutoff to suggest pulmonary embolism. 2. Commonly reported imaging features of COVID-19 pneumonia are present.  Other processes such as influenza pneumonia and organizing pneumonia, as can be seen with drug toxicity and connective tissue disease, can cause a similar imaging pattern. ([id number]) Peripheral, bilateral multi-lobar, GGO with or without consolidation or visible intralobular lines (\"crazy-paving\") Multifocal GGO of rounded morphology with or without consolidation or visible intralobular lines (\"crazy-paving\") Reverse halo sign or other findings of organizing pneumonia (seen later in the disease)       ECG/Telemetry: I have reviewed all ECGs.     ASSESSMENT AND PLAN      Hypoxia  Assessment & Plan  07/27/20  As above  o2 supplementation.     07/28/20  As above    07/30/20  As above    * Pneumonia due to COVID-19 virus  Assessment & Plan  Patient was tested positive for COVID-19 for respiratory symptoms of dry cough, shortness of breath, fevers, myalgias on July 21, patient has been taking Tylenol around-the-clock, quarantining himself  Now presenting with worsening of his symptoms with no relief persistent high temperatures, shortness of breath even at rest some chest tightness intermittently when he takes deep breath, also having cough feeling inability to bring up the expectoration and has been taking Mucinex 1200 mg twice daily with no relief  Patient was hypoxic at 89% on room air on arrival improved with 2 L nasal cannula to 97%  ER work-up lab work normal, CT of the chest consistent with bilateral infiltrates  Admitting for COVID-19 pneumonia, continue with oxygen supplementation, albuterol puff inhalation  Patient also reporting  lack of appetite and decreased intake will order protein supplements.  Ordered all the markers, consulting " infectious disease    07/27/20  Pt has been seen by ID. He will be started on remdesivir and steroids.   Pt is mildly hypoxic.   Symptomatic treatment.     07/28/20  Feeling better.   Continue remdesivir and decadron  Cont symptomatic tx  o2 supplementation    07/30/20  Cont symptomatic tx  o2 supplementation  Will complete 5 days of steroids and remdesivir.            VTE Assessment: Padua    VTE Prophylaxis Plan: heparin  Code Status: Full Code  Estimated Discharge Date: 8/3/2020  Disposition Planning: home when stable     Radha Mayo MD  7/31/2020

## 2020-07-31 NOTE — PROGRESS NOTES
Major Events:  None     Subjective:  In chair, looks ok, more anxious    Temp:  [36.7 °C (98 °F)-36.8 °C (98.3 °F)] 36.8 °C (98.3 °F)  Heart Rate:  [54-85] 54  Resp:  [16] 16  BP: (119-138)/(61-88) 138/88     SpO2 Readings from Last 3 Encounters:   07/31/20 93%     Physical Exam:  Skin: No rash  Sclera: Anicteric  Lungs: rales bases  CV: S1, S2 nl, no embolic changes  Abd: Soft, nt, no hsm  Extr: No jt eff, no edema  Neuro: Alert, lucid    Lab Results   Component Value Date    GLUCOSE 123 (H) 07/31/2020    CALCIUM 8.7 (L) 07/31/2020     07/31/2020    K 4.6 07/31/2020    CO2 24 07/31/2020     07/31/2020    BUN 25 (H) 07/31/2020    CREATININE 0.8 07/31/2020     Lab Results   Component Value Date    WBC 8.57 07/31/2020    HGB 15.2 07/31/2020    HCT 46.6 07/31/2020    MCV 90.1 07/31/2020     07/31/2020     Lab Results   Component Value Date    ALBUMIN 3.1 (L) 07/31/2020    BILITOT 0.9 07/31/2020    ALKPHOS 95 07/31/2020    AST 59 (H) 07/31/2020    ALT 82 (H) 07/31/2020    PROTEIN 6.0 07/31/2020     Impression    COVID  Tolerating remdesivir/dex, day 5/5  LFTs sl better    A-a stable/ better  Check D-dimer    IVANNA Carr MD  Pager [phone]

## 2020-07-31 NOTE — PLAN OF CARE
Problem: Adult Inpatient Plan of Care  Goal: Plan of Care Review  Outcome: Progressing  Flowsheets (Taken 7/31/2020 0406)  Progress: no change  Plan of Care Reviewed With: patient  Outcome Summary: No complaints overnight. Wearing 2L O2. Will continue to monitor.

## 2020-08-01 LAB
ALBUMIN SERPL-MCNC: 3.2 G/DL (ref 3.4–5)
ALP SERPL-CCNC: 88 IU/L (ref 35–126)
ALT SERPL-CCNC: 81 IU/L (ref 16–63)
ANION GAP SERPL CALC-SCNC: 7 MEQ/L (ref 3–15)
AST SERPL-CCNC: 44 IU/L (ref 15–41)
BASOPHILS # BLD: 0.05 K/UL (ref 0.01–0.1)
BASOPHILS NFR BLD: 0.5 %
BILIRUB SERPL-MCNC: 1 MG/DL (ref 0.3–1.2)
BUN SERPL-MCNC: 26 MG/DL (ref 8–20)
CALCIUM SERPL-MCNC: 8.7 MG/DL (ref 8.9–10.3)
CHLORIDE SERPL-SCNC: 103 MEQ/L (ref 98–109)
CK SERPL-CCNC: 25 U/L (ref 16–300)
CO2 SERPL-SCNC: 25 MEQ/L (ref 22–32)
CREAT SERPL-MCNC: 0.9 MG/DL (ref 0.8–1.3)
CRP SERPL-MCNC: 8.2 MG/L
DIFFERENTIAL METHOD BLD: ABNORMAL
EOSINOPHIL # BLD: 0.01 K/UL (ref 0.04–0.54)
EOSINOPHIL NFR BLD: 0.1 %
ERYTHROCYTE [DISTWIDTH] IN BLOOD BY AUTOMATED COUNT: 12.3 % (ref 11.6–14.4)
FERRITIN SERPL-MCNC: 544 NG/ML (ref 24–250)
GFR SERPL CREATININE-BSD FRML MDRD: >60 ML/MIN/1.73M*2
GLUCOSE SERPL-MCNC: 129 MG/DL (ref 70–99)
HCT VFR BLDCO AUTO: 45.7 % (ref 40.1–51)
HGB BLD-MCNC: 15 G/DL (ref 13.7–17.5)
IMM GRANULOCYTES # BLD AUTO: 0.34 K/UL (ref 0–0.08)
IMM GRANULOCYTES NFR BLD AUTO: 3.4 %
LDH SERPL L TO P-CCNC: 220 IU/L (ref 98–192)
LYMPHOCYTES # BLD: 0.9 K/UL (ref 1.2–3.5)
LYMPHOCYTES NFR BLD: 9.1 %
MCH RBC QN AUTO: 29.6 PG (ref 28–33.2)
MCHC RBC AUTO-ENTMCNC: 32.8 G/DL (ref 32.2–36.5)
MCV RBC AUTO: 90.1 FL (ref 83–98)
MONOCYTES # BLD: 0.74 K/UL (ref 0.3–1)
MONOCYTES NFR BLD: 7.5 %
NEUTROPHILS # BLD: 7.89 K/UL (ref 1.7–7)
NEUTS SEG NFR BLD: 79.4 %
NRBC BLD-RTO: 0 %
PDW BLD AUTO: 9.8 FL (ref 9.4–12.4)
PLATELET # BLD AUTO: 302 K/UL (ref 150–350)
POTASSIUM SERPL-SCNC: 4.8 MEQ/L (ref 3.6–5.1)
PROT SERPL-MCNC: 5.9 G/DL (ref 6–8.2)
RBC # BLD AUTO: 5.07 M/UL (ref 4.5–5.8)
SODIUM SERPL-SCNC: 135 MEQ/L (ref 136–144)
WBC # BLD AUTO: 9.93 K/UL (ref 3.8–10.5)

## 2020-08-01 PROCEDURE — 20600000 HC ROOM AND CARE INTERMEDIATE/TELEMETRY

## 2020-08-01 PROCEDURE — 83615 LACTATE (LD) (LDH) ENZYME: CPT | Performed by: INTERNAL MEDICINE

## 2020-08-01 PROCEDURE — 82728 ASSAY OF FERRITIN: CPT | Performed by: INTERNAL MEDICINE

## 2020-08-01 PROCEDURE — 80053 COMPREHEN METABOLIC PANEL: CPT | Performed by: INTERNAL MEDICINE

## 2020-08-01 PROCEDURE — 99231 SBSQ HOSP IP/OBS SF/LOW 25: CPT | Mod: CR | Performed by: FAMILY MEDICINE

## 2020-08-01 PROCEDURE — 85025 COMPLETE CBC W/AUTO DIFF WBC: CPT | Performed by: INTERNAL MEDICINE

## 2020-08-01 PROCEDURE — 82550 ASSAY OF CK (CPK): CPT | Performed by: INTERNAL MEDICINE

## 2020-08-01 PROCEDURE — 86140 C-REACTIVE PROTEIN: CPT | Performed by: INTERNAL MEDICINE

## 2020-08-01 PROCEDURE — 63600000 HC DRUGS/DETAIL CODE: Performed by: INTERNAL MEDICINE

## 2020-08-01 RX ADMIN — HEPARIN SODIUM 5000 UNITS: 5000 INJECTION, SOLUTION INTRAVENOUS; SUBCUTANEOUS at 14:46

## 2020-08-01 RX ADMIN — HEPARIN SODIUM 5000 UNITS: 5000 INJECTION, SOLUTION INTRAVENOUS; SUBCUTANEOUS at 05:03

## 2020-08-01 RX ADMIN — HEPARIN SODIUM 5000 UNITS: 5000 INJECTION, SOLUTION INTRAVENOUS; SUBCUTANEOUS at 21:14

## 2020-08-01 NOTE — PLAN OF CARE
Problem: Adult Inpatient Plan of Care  Goal: Plan of Care Review  Outcome: Progressing  Flowsheets (Taken 8/1/2020 1483)  Progress: improving  Plan of Care Reviewed With: patient  Outcome Summary: POC reviewed with pt, assessment completed, medications given. VSS. Pt  resting at this time, will continue to monitor.

## 2020-08-01 NOTE — PROGRESS NOTES
Hospital Medicine Service -  Daily Progress Note       SUBJECTIVE   Interval History:     No acute overnight events. Mr. Perez was seen and examined at bedside. HE feels better but notes that when he talks too much or moves out of his chair his oxygen saturation drops below 90% on room air. Denies fevers, chills, chest pain, palpitations, abdominal pain, nausea, vomiting, diarrhea.      OBJECTIVE      Vital signs in last 24 hours:  Temp:  [36.2 °C (97.1 °F)-37.2 °C (99 °F)] 36.4 °C (97.5 °F)  Heart Rate:  [53-97] 72  Resp:  [16-18] 18  BP: (110-141)/(73-95) 119/76  No intake or output data in the 24 hours ending 08/01/20 1609    PHYSICAL EXAMINATION      Physical Exam     General: No acute distress. Non toxic appearing.   HEENT: NC/AT  MMM  Respiratory: Clear breath sounds bilaterally. No wheezing, rhonchi, rales. Non labored breathing. No accessory muscle use  Cardiovascular: RRR. Normal S1 and S2. No murmurs, gallops, rubs   Abdomen: Soft, non distended, non tender. Bowel sounds present.   Psychiatric: Calm and cooperative.          LINES, CATHETERS, DRAINS, AIRWAYS, AND WOUNDS   Lines, Drains, Airways, Wounds:  Peripheral IV 07/26/20 Left Antecubital (Active)   Number of days: 6           LABS / IMAGING / TELE      Labs  CBC Results       08/01/20 07/31/20 07/30/20                    0605 0544 0627         WBC 9.93 8.57 7.25         RBC 5.07 5.17 5.20         HGB 15.0 15.2 15.5         HCT 45.7 46.6 46.9         MCV 90.1 90.1 90.2         MCH 29.6 29.4 29.8         MCHC 32.8 32.6 33.0          286 249                     CMP Results       08/01/20 07/31/20 07/30/20                    0605 0544 0627          137 137         K 4.8 4.6 4.5         Cl 103 103 102         CO2 25 24 24         Glucose 129 123 121         BUN 26 25 23         Creatinine 0.9 0.8 0.8         Calcium 8.7 8.7 8.7         Anion Gap 7 10 11         AST 44 59 45         ALT 81 82 65         Albumin 3.2 3.1 3.2         EGFR  >60.0 >60.0 >60.0                          ASSESSMENT AND PLAN      * Pneumonia due to COVID-19 virus  Assessment & Plan  Patient was tested positive for COVID-19 for respiratory symptoms of dry cough, shortness of breath, fevers, myalgias on July 21, patient has been taking Tylenol around-the-clock, quarantining himself  Now presenting with worsening of his symptoms with no relief persistent high temperatures, shortness of breath even at rest some chest tightness intermittently when he takes deep breath, also having cough feeling inability to bring up the expectoration and has been taking Mucinex 1200 mg twice daily with no relief  Patient was hypoxic at 89% on room air on arrival improved with 2 L nasal cannula to 97%  ER work-up lab work normal, CT of the chest consistent with bilateral infiltrates  Admitting for COVID-19 pneumonia, continue with oxygen supplementation, albuterol puff inhalation  Patient also reporting  lack of appetite and decreased intake will order protein supplements.  Ordered all the markers, consulting infectious disease    07/27/20  Pt has been seen by ID. He will be started on remdesivir and steroids.   Pt is mildly hypoxic.   Symptomatic treatment.     07/28/20  Feeling better.   Continue remdesivir and decadron  Cont symptomatic tx  o2 supplementation    07/30/20  Cont symptomatic tx  o2 supplementation  Will complete 5 days of steroids and remdesivir.     07/31/20  Day 4 of 5 of remdesivir +decadron  Pt still requiring 2 lt of NC    08/01/20   Completed remdesivir + decadron   Still requiring oxygen with ambulation.   Possible discharge tomorrow.   Check O2 with ambulation.       Hypoxia  Assessment & Plan  See above          VTE Prophylaxis Plan: Subcutaneous heparin   Code Status: Full Code  Estimated Discharge Date: 8/3/2020        Nicholas Acevedo MD  8/1/2020

## 2020-08-01 NOTE — PROGRESS NOTES
Major Events:  None     Subjective:  In chair, looks ok, on RA    Temp:  [36.2 °C (97.1 °F)-37.2 °C (99 °F)] 36.7 °C (98 °F)  Heart Rate:  [53-97] 71  Resp:  [16-18] 18  BP: (112-141)/(71-95) 120/79     SpO2 Readings from Last 3 Encounters:   08/01/20 (!) 91%     Physical Exam:  Skin: No rash  Sclera: Anicteric  Lungs: rales bases  CV: S1, S2 nl, no embolic changes  Abd: Soft, nt, no hsm  Extr: No jt eff, no edema  Neuro: Alert, lucid    Lab Results   Component Value Date    GLUCOSE 129 (H) 08/01/2020    CALCIUM 8.7 (L) 08/01/2020     (L) 08/01/2020    K 4.8 08/01/2020    CO2 25 08/01/2020     08/01/2020    BUN 26 (H) 08/01/2020    CREATININE 0.9 08/01/2020     Lab Results   Component Value Date    WBC 9.93 08/01/2020    HGB 15.0 08/01/2020    HCT 45.7 08/01/2020    MCV 90.1 08/01/2020     08/01/2020     Lab Results   Component Value Date    ALBUMIN 3.2 (L) 08/01/2020    BILITOT 1.0 08/01/2020    ALKPHOS 88 08/01/2020    AST 44 (H) 08/01/2020    ALT 81 (H) 08/01/2020    PROTEIN 5.9 (L) 08/01/2020     Impression    COVID  Finished remdesivir, dex  LFTs sl better    A-a better  D-dimer nl    Call c questions    IVANNA Carr MD  Pager [phone]

## 2020-08-01 NOTE — PLAN OF CARE
Problem: Adult Inpatient Plan of Care  Goal: Plan of Care Review  Outcome: Progressing  Flowsheets (Taken 8/1/2020 1630)  Progress: improving  Plan of Care Reviewed With: patient  Outcome Summary: Patient in good spirits and progressing well.  O2 sats maintained on room air, patient frequently walks within the room checking his ambulating O2.  No complaints of pain or sob this shift.  Patient tolerating meals.  No ectopy on monitor.  Patient looking forward to returning home.  VSS, will continue to monitor.

## 2020-08-02 VITALS
WEIGHT: 187.9 LBS | SYSTOLIC BLOOD PRESSURE: 111 MMHG | TEMPERATURE: 98 F | HEIGHT: 66 IN | RESPIRATION RATE: 18 BRPM | HEART RATE: 77 BPM | DIASTOLIC BLOOD PRESSURE: 80 MMHG | BODY MASS INDEX: 30.2 KG/M2 | OXYGEN SATURATION: 95 %

## 2020-08-02 PROBLEM — R74.01 TRANSAMINITIS: Status: ACTIVE | Noted: 2020-08-02

## 2020-08-02 PROBLEM — E87.5 HYPERKALEMIA: Status: ACTIVE | Noted: 2020-08-02

## 2020-08-02 LAB
ALBUMIN SERPL-MCNC: 3.3 G/DL (ref 3.4–5)
ALP SERPL-CCNC: 87 IU/L (ref 35–126)
ALT SERPL-CCNC: 76 IU/L (ref 16–63)
ANION GAP SERPL CALC-SCNC: 6 MEQ/L (ref 3–15)
AST SERPL-CCNC: 46 IU/L (ref 15–41)
BILIRUB SERPL-MCNC: 1.2 MG/DL (ref 0.3–1.2)
BUN SERPL-MCNC: 30 MG/DL (ref 8–20)
CALCIUM SERPL-MCNC: 9 MG/DL (ref 8.9–10.3)
CHLORIDE SERPL-SCNC: 104 MEQ/L (ref 98–109)
CO2 SERPL-SCNC: 28 MEQ/L (ref 22–32)
CREAT SERPL-MCNC: 0.9 MG/DL (ref 0.8–1.3)
GFR SERPL CREATININE-BSD FRML MDRD: >60 ML/MIN/1.73M*2
GLUCOSE SERPL-MCNC: 95 MG/DL (ref 70–99)
POTASSIUM SERPL-SCNC: 5.2 MEQ/L (ref 3.6–5.1)
PROT SERPL-MCNC: 5.7 G/DL (ref 6–8.2)
SODIUM SERPL-SCNC: 138 MEQ/L (ref 136–144)

## 2020-08-02 PROCEDURE — 80053 COMPREHEN METABOLIC PANEL: CPT | Performed by: INTERNAL MEDICINE

## 2020-08-02 PROCEDURE — 36415 COLL VENOUS BLD VENIPUNCTURE: CPT | Performed by: INTERNAL MEDICINE

## 2020-08-02 PROCEDURE — 99238 HOSP IP/OBS DSCHRG MGMT 30/<: CPT | Mod: CR | Performed by: FAMILY MEDICINE

## 2020-08-02 PROCEDURE — 63600000 HC DRUGS/DETAIL CODE: Performed by: INTERNAL MEDICINE

## 2020-08-02 RX ORDER — ACETAMINOPHEN 325 MG/1
650 TABLET ORAL EVERY 4 HOURS PRN
Start: 2020-08-02 | End: 2020-09-01

## 2020-08-02 RX ORDER — ALBUTEROL SULFATE 90 UG/1
2 INHALANT RESPIRATORY (INHALATION) EVERY 6 HOURS PRN
Qty: 1 INHALER | Refills: 1 | Status: SHIPPED | OUTPATIENT
Start: 2020-08-02 | End: 2020-09-01

## 2020-08-02 RX ADMIN — HEPARIN SODIUM 5000 UNITS: 5000 INJECTION, SOLUTION INTRAVENOUS; SUBCUTANEOUS at 05:40

## 2020-08-02 NOTE — PROGRESS NOTES
8/2/2020 1:23 PM - chart reviewed and noted pt on room air and no longer requiring O2.  Plan is for d/c to home today no needs.  Updated d/c instructions.  Meli minor MSW LSW

## 2020-08-02 NOTE — DISCHARGE SUMMARY
Hospital Medicine Service -  Inpatient Discharge Summary        BRIEF OVERVIEW   Admitting Provider: Jen Farah MD  Attending Provider: Nicholas Acevedo MD Attending phys phone: [phone]    PCP: Rinku Mccain [phone]    Admission Date: 7/26/2020  Discharge Date: 8/2/2020     DISCHARGE DIAGNOSES      Primary Discharge Diagnosis  Pneumonia due to COVID-19 virus    Secondary Discharge Diagnoses  Active Hospital Problems    Diagnosis Date Noted   • Pneumonia due to COVID-19 virus 07/26/2020     Priority: High   • Transaminitis 08/02/2020   • Hyperkalemia 08/02/2020   • Hypoxia 07/27/2020      Resolved Hospital Problems   No resolved problems to display.       Problem List on Day of Discharge  * Pneumonia due to COVID-19 virus  Assessment & Plan  Patient was tested positive for COVID-19 for respiratory symptoms of dry cough, shortness of breath, fevers, myalgias on July 21, patient has been taking Tylenol around-the-clock, quarantining himself  Now presenting with worsening of his symptoms with no relief persistent high temperatures, shortness of breath even at rest some chest tightness intermittently when he takes deep breath, also having cough feeling inability to bring up the expectoration and has been taking Mucinex 1200 mg twice daily with no relief  Patient was hypoxic at 89% on room air on arrival improved with 2 L nasal cannula to 97%  ER work-up lab work normal, CT of the chest consistent with bilateral infiltrates  Admitting for COVID-19 pneumonia, continue with oxygen supplementation, albuterol puff inhalation  Patient also reporting  lack of appetite and decreased intake will order protein supplements.  Ordered all the markers, consulting infectious disease    07/27/20  Pt has been seen by ID. He will be started on remdesivir and steroids.   Pt is mildly hypoxic.   Symptomatic treatment.     07/28/20  Feeling better.   Continue remdesivir and decadron  Cont symptomatic tx  o2  supplementation    07/30/20  Cont symptomatic tx  o2 supplementation  Will complete 5 days of steroids and remdesivir.     07/31/20  Day 4 of 5 of remdesivir +decadron  Pt still requiring 2 lt of NC    08/01/20   Completed remdesivir + decadron   Still requiring oxygen with ambulation.   Possible discharge tomorrow.   Check O2 with ambulation.       Hyperkalemia  Assessment & Plan  Mildly elevated K of 5.2     Transaminitis  Assessment & Plan  Mildly elevated LFTs  PCP to follow up as an outpatient    Hypoxia  Assessment & Plan  See above      SUMMARY OF HOSPITALIZATION      Presenting Problem/History of Present Illness  Pneumonia due to COVID-19 virus    This is a 60 y.o. year-old male admitted on 7/26/2020 with Pneumonia of both lungs due to infectious organism, unspecified part of lung [J18.9]  COVID-19 virus infection [U07.1]  Pneumonia due to COVID-19 virus [U07.1, J12.89].         Hospital Course    Jason Perez is a 60 year old man with no significant PMHx who presented to [facility] on 7/26/20 with a 7+ day hx of fever and dyspnea with recent test + for COVID 19.  He was treated with remdesivir and decadron. Infectious disease was consulted. He was given supplemental oxygen as needed and then eventually weaned off to room air. He is to follow up with his PCP.     Exam on Day of Discharge  Physical Exam     General: No acute distress. Non toxic appearing.   HEENT: NC/AT  MMM  Respiratory: Clear breath sounds bilaterally. No wheezing, rhonchi, rales. Non labored breathing. No accessory muscle use  Cardiovascular: RRR. Normal S1 and S2. No murmurs, gallops, rubs   Abdomen: Soft, non distended, non tender. Bowel sounds present.   Psychiatric: Calm and cooperative.      Consults During Admission  IP CONSULT TO INFECTIOUS DISEASE    DISCHARGE MEDICATIONS        Medication List      CHANGE how you take these medications    acetaminophen 325 mg tablet  Commonly known as:  TYLENOL  Take 2 tablets (650 mg  total) by mouth every 4 (four) hours as needed for moderate pain or fever.  Dose:  650 mg  What changed:    · medication strength  · how much to take  · when to take this  · reasons to take this     albuterol HFA 90 mcg/actuation inhaler  Commonly known as:  VENTOLIN HFA  Inhale 2 puffs every 6 (six) hours as needed for wheezing or shortness of breath.  Dose:  2 puff  What changed:  reasons to take this        STOP taking these medications    predniSONE 20 mg tablet  Commonly known as:  DELTASONE                    PROCEDURES / LABS / IMAGING           Pertinent Labs  CBC Results       08/01/20 07/31/20 07/30/20                    0605 0544 0627         WBC 9.93 8.57 7.25         RBC 5.07 5.17 5.20         HGB 15.0 15.2 15.5         HCT 45.7 46.6 46.9         MCV 90.1 90.1 90.2         MCH 29.6 29.4 29.8         MCHC 32.8 32.6 33.0          286 249                     CMP Results       08/02/20 08/01/20 07/31/20                    0558 0605 0544          135 137         K 5.2 4.8 4.6         Cl 104 103 103         CO2 28 25 24         Glucose 95 129 123         BUN 30 26 25         Creatinine 0.9 0.9 0.8         Calcium 9.0 8.7 8.7         Anion Gap 6 7 10         AST 46 44 59         ALT 76 81 82         Albumin 3.3 3.2 3.1         EGFR >60.0 >60.0 >60.0                         Pertinent Imaging  X-ray Chest 1 View    Result Date: 7/30/2020  IMPRESSION: Mild patchy bilateral airspace disease without significant change since 07/26/2020. COMMENT: Erect AP portable view of the chest was performed. Comparison is made to a chest CT and chest x-ray from 07/26/2020. Mild patchy bilateral airspace disease is again noted, right greater than left, without significant change since the prior study.  This is better seen by CT. No pleural effusions are seen. The heart size is mildly prominent but unchanged.  The pulmonary vasculature is not engorged. The hilar and mediastinal structures are stable.    X-ray Chest 1  "View    Result Date: 7/27/2020  IMPRESSION: Patchy bilateral airspace opacities consistent with known covid 19 pneumonia    Ct Chest Pulmonary Embolism With Iv Contrast    Result Date: 7/26/2020  IMPRESSION: 1. No evidence for filling defect or vessel cutoff to suggest pulmonary embolism. 2. Commonly reported imaging features of COVID-19 pneumonia are present.  Other processes such as influenza pneumonia and organizing pneumonia, as can be seen with drug toxicity and connective tissue disease, can cause a similar imaging pattern. ([id number]) Peripheral, bilateral multi-lobar, GGO with or without consolidation or visible intralobular lines (\"crazy-paving\") Multifocal GGO of rounded morphology with or without consolidation or visible intralobular lines (\"crazy-paving\") Reverse halo sign or other findings of organizing pneumonia (seen later in the disease)       OUTPATIENT  FOLLOW-UP / REFERRALS / PENDING TESTS        Outpatient Follow-Up Appointments  Encounter Information     You do not currently have any appointments scheduled.          Referrals  No orders of the defined types were placed in this encounter.      Test Results Pending at Discharge  Unresulted Labs (From admission, onward)     Start     Ordered    07/28/20 0600  CK, Total  Every other day     Start Status   08/03/20 0600 Scheduled   08/05/20 0600 Scheduled   08/07/20 0600 Scheduled   08/09/20 0600 Scheduled       07/26/20 2257    07/28/20 0600  C-reactive protein  Every other day     Start Status   08/03/20 0600 Scheduled   08/05/20 0600 Scheduled   08/07/20 0600 Scheduled   08/09/20 0600 Scheduled       07/26/20 2257    07/28/20 0600  Ferritin  Every other day     Start Status   08/03/20 0600 Scheduled   08/05/20 0600 Scheduled   08/07/20 0600 Scheduled   08/09/20 0600 Scheduled       07/26/20 2257    07/28/20 0600  Lactate dehydrogenase  Every other day     Start Status   08/03/20 0600 Scheduled   08/05/20 0600 Scheduled   08/07/20 0600 Scheduled "   08/09/20 0600 Scheduled       07/26/20 2257    07/27/20 0600  CBC and Differential  Daily     Start Status     08/02/20 0625 Needs to be Collected Order ID: [id number]   08/03/20 0600 Scheduled    08/04/20 0600 Scheduled    08/05/20 0600 Scheduled        07/26/20 2257    07/27/20 0600  Comprehensive metabolic panel  Daily     Start Status   08/03/20 0600 Scheduled   08/04/20 0600 Scheduled   08/05/20 0600 Scheduled       07/26/20 2257                Important Issues to Address in Follow-Up    - Follow up with PCP   - Mildly elevated LFTs. PCP to follow     DISCHARGE DISPOSITION      Disposition: Home  Code Status At Discharge: Full Code    Physician Order for Life-Sustaining Treatment Document Status      No documents found

## 2020-08-02 NOTE — NURSING NOTE
Patient verbalized understanding of discharge instructions.  All questions/concerns addressed.  Patient was very thankful for the care he received here. IV discontinued and monitor returned to MR. Patient was escorted via wheelchair to [facility] area to return home with significant other.

## 2020-08-02 NOTE — DISCHARGE INSTRUCTIONS
While you were hospitalized you were tested for COVID-19/Coronavirus and the results were positive (meaning you do/did have COVID-19/Coronavirus).  The Health Department will be in touch with you.  If they do not call, please contact them using the appropriate phone number below.     After discharge the [facility] requires you to remain in quarantine (away from others) for at least 7 days after your symptoms started PLUS at least 3 days (72 hours) have passed since you were better (meaning no fever AND improvement in respiratory symptoms (e.g., cough, shortness of breath).  If you are a healthcare provider please ensure you check with the Department of Health before removing yourself from quarantine.     The [place] Centers for Disease Control provides us with instructions of how you should be quarantined.  These instructions can also be found at: https://www.cdc.gov/coronavirus/2019-ncov/downloads/sick-with-2019-nCoV-fact-sheet.pdf     • Stay home except to get medical care. You should restrict activities outside your home, except for getting medical care. Do not go to work, school, or public areas. Avoid using public transportation, ride-sharing, or taxis.  • Separate yourself from other people and animals in your home. People: As much as possible, you should stay in a specific room and away from other people in your home. Also, you should use a separate bathroom, if available. Animals: Do not handle pets or other animals while sick.  • Call ahead before visiting your doctor. If you have a medical appointment, call the healthcare provider and tell them that you have or may have COVID-19. This will help the healthcare provider's office take steps to keep other people from getting infected or exposed.  • Wear a facemask: You should wear a facemask when you are around other people (e.g., sharing a room or vehicle) or pets and before you enter a healthcare provider's office. If you are not able to  wear a facemask (for example, because it causes trouble breathing), then people who live with you should not stay in the same room with you, or they should wear a facemask if they enter your room.  • Cover your coughs and sneezes. Cover your mouth and nose with a tissue when you cough or sneeze. Throw used tissues in a lined trash can; immediately wash your hands with soap and water for at least 20 seconds or clean your hands with an alcohol-based hand  that contains at least 60-95% alcohol covering all surfaces of your hands and rubbing them together until they feel dry. Soap and water should be used preferentially if hands are visibly dirty  • Avoid sharing personal household items. You should not share dishes, drinking glasses, cups, eating utensils, towels, or bedding with other people or pets in your home. After using these items, they should be washed thoroughly with soap and water.  • Clean your hands often. Wash your hands often with soap and water for at least 20 seconds. If soap and water are not available, clean your hands with an alcohol-based hand  that contains at least 60% alcohol, covering all surfaces of your hands and rubbing them together until they feel dry. Soap and water should be used preferentially if hands are visibly dirty. Avoid touching your eyes, nose, and mouth with unwashed hands.  • Clean all ?igh-touch” surfaces every day. High touch surfaces include counters, tabletops, doorknobs, bathroom fixtures, toilets, phones, keyboards, tablets, and bedside tables. Also, clean any surfaces that may have blood, stool, or body fluids on them. Use a household cleaning spray or wipe, according to the label instructions. Labels contain instructions for safe and effective use of the cleaning product including precautions you should take when applying the product, such as wearing gloves and making sure you have good ventilation during use of the product.  • Monitor your symptoms.  Seek prompt medical attention if your illness is worsening (e.g., difficulty breathing). Before seeking care, call your healthcare provider and tell them that you have, or are being evaluated for, COVID-19. Put on a facemask before you enter the facility. These steps will help the healthcare provider's office to keep other people in the office or waiting room from getting infected or exposed.  • If you have a medical emergency and need to call 911, notify the dispatch personnel that you have, or are being evaluated for COVID-19. If possible, put on a facemask before emergency medical services arrive.        If you notice any worsening shortness of breath, light-headedness, confusion, extreme fatigue please call you doctor or return to the hospital immediately.      For further information or if you are not called by the Health Department:     For All [place] Residents  [place] Department of Health Information webpage: [email or web address].[place].gov/providers/Providers/Pages/Coronavirus-2020.aspx  [place] Department of Health COVID-19 phone number: 1-966-[employer] (1-226.494.2933)     For All [facility] webpage: [email or web address]://[email or web address]/services/mental-physical-health/[email or web address]-health-hazards/[email or web address]-19/  [facility] phone number: 1-692.677.9288     For All [facility] Residents  [facility] webpage: [email or web address]-[email or web address]/pages/covid-19  [facility] phone number: [phone]     For Residents of [facility] Information webpage: https://www.[email or web address].[place].gov/providers/Providers/Pages/Coronavirus-2020.aspx  [place] Department of Health COVID-19 phone number: 4-594-[employer] (1-392.727.3460)     For all [facility] Residents  [facility] webpage: [email or web address]  [facility]  Department of [facility] phone number: [phone]     [facility] Center for Disease Control (CDC) COVID-19 Information Webpage: https://www.cdc.gov/coronavirus/2019-nCoV/index.html

## 2020-08-02 NOTE — PLAN OF CARE
Problem: Adult Inpatient Plan of Care  Goal: Plan of Care Review  Outcome: Progressing  Flowsheets (Taken 8/2/2020 0558)  Progress: improving  Plan of Care Reviewed With: patient  Outcome Summary: POC reviewed with pt, assessment completed, medications given. VSS, pt denies SOB on RA. Pt resting at this time, will continue to monitor.

## 2020-08-03 ENCOUNTER — PATIENT OUTREACH (OUTPATIENT)
Dept: CASE MANAGEMENT | Facility: CLINIC | Age: 60
End: 2020-08-03

## 2020-08-03 NOTE — PROGRESS NOTES
Covid inpt d/c outreach  Spoke with patient and introduced myself and purpose of call.  Patient stated that he is tired but doing extremely well. Aware of Covid precautions and quarantine. Patient confirmed that Dr Mccain has not been his pcp for a few years, and Rolando Lou NP has been the provider of care. Removed Dr Mccain from the care team, and DAVID Lou's information is already noted under snapshot in care teams.  Declined any questions or concerns at this time. Declined need for further outreach. Has tele health visit with pulm next week.    NAME: Jason Perez    MRN: [id number]    YOB: 1960    EVENT DETAILS    Discharging Facility: [facility]  Date of Admission: 07/26/20  Date of Discharge: 08/02/20  Discharge Instructions Reviewed?: Yes     Reason for Admission:  Pneumonia due to COVID-19 virus    HPI: 60 year old man with no significant PMHx who presented to [facility] on 7/26/20 with a 7+ day hx of fever and dyspnea with recent test + for COVID 19.  He was treated with remdesivir and decadron. Infectious disease was consulted. He was given supplemental oxygen as needed and then eventually weaned off to room air. He is to follow up with his PCP.     MEDICATION REVIEW:    Reported by:: Patient  Prescriptions Filled?: Yes     Was a medication discrepancy indentified?: No     Medication understanding?: Yes     Medication Adherence?: Yes     Any side effects from medication?: No       Medication review none    Additional Comments: patient confirmed obtaining meds. Ventolin inhaler is prn    FOLLOW-UP TESTS/PROCEDURES:  Has f/u with pulm 7/11/20    HOME MANAGEMENT    Living Arrangement: Spouse or Significant Other       HOME CARE SERVICES    Receiving Home Care Services: No      DURABLE MEDICAL EQUIPMENT    Durable Medical Equipment: None  Oxygen Use: No      BARRIERS TO CARE declined    SELF-CARE    Living Arrangement: Spouse or Significant Other        SOCIOECONOMIC    Financial Problems?: No     Transportation Issues?: No      INTERVENTION/CARE COORDINATION  Covid precautions and quarantine reviewed       PLAN OF CARE:    Reviewed signs/symptoms of worsening condition or complication that necessitate a call to the Physician's office.  Educated patient on access to care.  RN phone number given for future care management needs.       Doris Ernst RN